# Patient Record
Sex: FEMALE | Race: BLACK OR AFRICAN AMERICAN | NOT HISPANIC OR LATINO | URBAN - METROPOLITAN AREA
[De-identification: names, ages, dates, MRNs, and addresses within clinical notes are randomized per-mention and may not be internally consistent; named-entity substitution may affect disease eponyms.]

---

## 2017-01-29 ENCOUNTER — EMERGENCY (EMERGENCY)
Facility: HOSPITAL | Age: 32
LOS: 1 days | Discharge: HOME | End: 2017-01-29
Attending: EMERGENCY MEDICINE | Admitting: EMERGENCY MEDICINE
Payer: SELF-PAY

## 2017-01-29 VITALS — DIASTOLIC BLOOD PRESSURE: 74 MMHG | HEART RATE: 72 BPM | SYSTOLIC BLOOD PRESSURE: 107 MMHG

## 2017-01-29 VITALS
OXYGEN SATURATION: 97 % | SYSTOLIC BLOOD PRESSURE: 120 MMHG | HEIGHT: 66 IN | DIASTOLIC BLOOD PRESSURE: 68 MMHG | TEMPERATURE: 98 F | HEART RATE: 61 BPM | WEIGHT: 188.94 LBS | RESPIRATION RATE: 16 BRPM

## 2017-01-29 DIAGNOSIS — Z98.89 OTHER SPECIFIED POSTPROCEDURAL STATES: Chronic | ICD-10-CM

## 2017-01-29 LAB
ALBUMIN SERPL ELPH-MCNC: 3.9 G/DL — SIGNIFICANT CHANGE UP (ref 3.3–5)
ALP SERPL-CCNC: 52 U/L — SIGNIFICANT CHANGE UP (ref 40–120)
ALT FLD-CCNC: 23 U/L RC — SIGNIFICANT CHANGE UP (ref 10–45)
ANION GAP SERPL CALC-SCNC: 16 MMOL/L — SIGNIFICANT CHANGE UP (ref 5–17)
APPEARANCE UR: CLEAR — SIGNIFICANT CHANGE UP
AST SERPL-CCNC: 14 U/L — SIGNIFICANT CHANGE UP (ref 10–40)
BACTERIA # UR AUTO: ABNORMAL /HPF
BASOPHILS # BLD AUTO: 0.1 K/UL — SIGNIFICANT CHANGE UP (ref 0–0.2)
BASOPHILS NFR BLD AUTO: 1.1 % — SIGNIFICANT CHANGE UP (ref 0–2)
BILIRUB SERPL-MCNC: 0.2 MG/DL — SIGNIFICANT CHANGE UP (ref 0.2–1.2)
BILIRUB UR-MCNC: NEGATIVE — SIGNIFICANT CHANGE UP
BUN SERPL-MCNC: 13 MG/DL — SIGNIFICANT CHANGE UP (ref 7–23)
CALCIUM SERPL-MCNC: 9.1 MG/DL — SIGNIFICANT CHANGE UP (ref 8.4–10.5)
CHLORIDE SERPL-SCNC: 100 MMOL/L — SIGNIFICANT CHANGE UP (ref 96–108)
CO2 SERPL-SCNC: 21 MMOL/L — LOW (ref 22–31)
COLOR SPEC: YELLOW — SIGNIFICANT CHANGE UP
CREAT SERPL-MCNC: 0.71 MG/DL — SIGNIFICANT CHANGE UP (ref 0.5–1.3)
DIFF PNL FLD: NEGATIVE — SIGNIFICANT CHANGE UP
EOSINOPHIL # BLD AUTO: 0.2 K/UL — SIGNIFICANT CHANGE UP (ref 0–0.5)
EOSINOPHIL NFR BLD AUTO: 2.5 % — SIGNIFICANT CHANGE UP (ref 0–6)
EPI CELLS # UR: SIGNIFICANT CHANGE UP /HPF
GAS PNL BLDV: SIGNIFICANT CHANGE UP
GLUCOSE SERPL-MCNC: 85 MG/DL — SIGNIFICANT CHANGE UP (ref 70–99)
GLUCOSE UR QL: NEGATIVE — SIGNIFICANT CHANGE UP
HCG SERPL-ACNC: SIGNIFICANT CHANGE UP MIU/ML
HCT VFR BLD CALC: 37.9 % — SIGNIFICANT CHANGE UP (ref 34.5–45)
HGB BLD-MCNC: 12.9 G/DL — SIGNIFICANT CHANGE UP (ref 11.5–15.5)
KETONES UR-MCNC: ABNORMAL
LEUKOCYTE ESTERASE UR-ACNC: ABNORMAL
LIDOCAIN IGE QN: 28 U/L — SIGNIFICANT CHANGE UP (ref 7–60)
LYMPHOCYTES # BLD AUTO: 2.5 K/UL — SIGNIFICANT CHANGE UP (ref 1–3.3)
LYMPHOCYTES # BLD AUTO: 30 % — SIGNIFICANT CHANGE UP (ref 13–44)
MCHC RBC-ENTMCNC: 29.8 PG — SIGNIFICANT CHANGE UP (ref 27–34)
MCHC RBC-ENTMCNC: 34 GM/DL — SIGNIFICANT CHANGE UP (ref 32–36)
MCV RBC AUTO: 87.8 FL — SIGNIFICANT CHANGE UP (ref 80–100)
MONOCYTES # BLD AUTO: 0.7 K/UL — SIGNIFICANT CHANGE UP (ref 0–0.9)
MONOCYTES NFR BLD AUTO: 8.9 % — SIGNIFICANT CHANGE UP (ref 2–14)
NEUTROPHILS # BLD AUTO: 4.8 K/UL — SIGNIFICANT CHANGE UP (ref 1.8–7.4)
NEUTROPHILS NFR BLD AUTO: 57.5 % — SIGNIFICANT CHANGE UP (ref 43–77)
NITRITE UR-MCNC: NEGATIVE — SIGNIFICANT CHANGE UP
PH UR: 6 — SIGNIFICANT CHANGE UP (ref 4.8–8)
PLATELET # BLD AUTO: 203 K/UL — SIGNIFICANT CHANGE UP (ref 150–400)
POTASSIUM SERPL-MCNC: 4.1 MMOL/L — SIGNIFICANT CHANGE UP (ref 3.5–5.3)
POTASSIUM SERPL-SCNC: 4.1 MMOL/L — SIGNIFICANT CHANGE UP (ref 3.5–5.3)
PROT SERPL-MCNC: 7.7 G/DL — SIGNIFICANT CHANGE UP (ref 6–8.3)
PROT UR-MCNC: SIGNIFICANT CHANGE UP
RBC # BLD: 4.32 M/UL — SIGNIFICANT CHANGE UP (ref 3.8–5.2)
RBC # FLD: 12.7 % — SIGNIFICANT CHANGE UP (ref 10.3–14.5)
RBC CASTS # UR COMP ASSIST: SIGNIFICANT CHANGE UP /HPF (ref 0–2)
SODIUM SERPL-SCNC: 137 MMOL/L — SIGNIFICANT CHANGE UP (ref 135–145)
SP GR SPEC: 1.03 — HIGH (ref 1.01–1.02)
UROBILINOGEN FLD QL: NEGATIVE — SIGNIFICANT CHANGE UP
WBC # BLD: 8.3 K/UL — SIGNIFICANT CHANGE UP (ref 3.8–10.5)
WBC # FLD AUTO: 8.3 K/UL — SIGNIFICANT CHANGE UP (ref 3.8–10.5)
WBC UR QL: SIGNIFICANT CHANGE UP /HPF (ref 0–5)

## 2017-01-29 PROCEDURE — 82435 ASSAY OF BLOOD CHLORIDE: CPT

## 2017-01-29 PROCEDURE — 85027 COMPLETE CBC AUTOMATED: CPT

## 2017-01-29 PROCEDURE — 87086 URINE CULTURE/COLONY COUNT: CPT

## 2017-01-29 PROCEDURE — 83605 ASSAY OF LACTIC ACID: CPT

## 2017-01-29 PROCEDURE — 96375 TX/PRO/DX INJ NEW DRUG ADDON: CPT

## 2017-01-29 PROCEDURE — 76815 OB US LIMITED FETUS(S): CPT

## 2017-01-29 PROCEDURE — 93975 VASCULAR STUDY: CPT

## 2017-01-29 PROCEDURE — 82803 BLOOD GASES ANY COMBINATION: CPT

## 2017-01-29 PROCEDURE — 84295 ASSAY OF SERUM SODIUM: CPT

## 2017-01-29 PROCEDURE — 93975 VASCULAR STUDY: CPT | Mod: 26

## 2017-01-29 PROCEDURE — 76817 TRANSVAGINAL US OBSTETRIC: CPT | Mod: 26

## 2017-01-29 PROCEDURE — 76817 TRANSVAGINAL US OBSTETRIC: CPT

## 2017-01-29 PROCEDURE — 80053 COMPREHEN METABOLIC PANEL: CPT

## 2017-01-29 PROCEDURE — 82947 ASSAY GLUCOSE BLOOD QUANT: CPT

## 2017-01-29 PROCEDURE — 82330 ASSAY OF CALCIUM: CPT

## 2017-01-29 PROCEDURE — 84702 CHORIONIC GONADOTROPIN TEST: CPT

## 2017-01-29 PROCEDURE — 76815 OB US LIMITED FETUS(S): CPT | Mod: 26

## 2017-01-29 PROCEDURE — 99284 EMERGENCY DEPT VISIT MOD MDM: CPT | Mod: 25

## 2017-01-29 PROCEDURE — 99285 EMERGENCY DEPT VISIT HI MDM: CPT

## 2017-01-29 PROCEDURE — 96374 THER/PROPH/DIAG INJ IV PUSH: CPT

## 2017-01-29 PROCEDURE — 85014 HEMATOCRIT: CPT

## 2017-01-29 PROCEDURE — 83690 ASSAY OF LIPASE: CPT

## 2017-01-29 PROCEDURE — 81001 URINALYSIS AUTO W/SCOPE: CPT

## 2017-01-29 PROCEDURE — 84132 ASSAY OF SERUM POTASSIUM: CPT

## 2017-01-29 RX ORDER — NITROFURANTOIN MACROCRYSTAL 50 MG
100 CAPSULE ORAL ONCE
Qty: 0 | Refills: 0 | Status: COMPLETED | OUTPATIENT
Start: 2017-01-29 | End: 2017-01-29

## 2017-01-29 RX ORDER — ONDANSETRON 8 MG/1
4 TABLET, FILM COATED ORAL ONCE
Qty: 0 | Refills: 0 | Status: COMPLETED | OUTPATIENT
Start: 2017-01-29 | End: 2017-01-29

## 2017-01-29 RX ORDER — NITROFURANTOIN MACROCRYSTAL 50 MG
1 CAPSULE ORAL
Qty: 14 | Refills: 0
Start: 2017-01-29 | End: 2017-02-05

## 2017-01-29 RX ORDER — ONDANSETRON 8 MG/1
1 TABLET, FILM COATED ORAL
Qty: 10 | Refills: 0 | OUTPATIENT
Start: 2017-01-29 | End: 2017-02-03

## 2017-01-29 RX ORDER — ACETAMINOPHEN 500 MG
1000 TABLET ORAL ONCE
Qty: 0 | Refills: 0 | Status: DISCONTINUED | OUTPATIENT
Start: 2017-01-29 | End: 2017-02-02

## 2017-01-29 RX ADMIN — Medication 202 MILLIGRAM(S): at 16:58

## 2017-01-29 RX ADMIN — Medication 100 MILLIGRAM(S): at 16:58

## 2017-01-29 RX ADMIN — ONDANSETRON 4 MILLIGRAM(S): 8 TABLET, FILM COATED ORAL at 16:17

## 2017-01-29 NOTE — ED PROVIDER NOTE - PHYSICAL EXAMINATION
A&O x 3 in mild distress 2/2 nausea and abdominal pain. Heart RRR w/o murmur. Lungs CTA b/l without wheezes, rhonchi, or rales. Abdomen LLQ tenderness to palpation. Speculum exam no blood. Thick white cervical discharge noted on exam. No CMT or adnexal tenderness noted on bimanual exam. No CVAT elicited. CN 2-12 grossly intact without sensory or motor deficit noted. No vascular compromise noted.

## 2017-01-29 NOTE — ED PROVIDER NOTE - MEDICAL DECISION MAKING DETAILS
Attending MD Rodriguez: 31F with no reported PMH presents with nausea, vomiting, lower abdominal cramping pain and intermittent spotting in pregnancy.  Reports , LMP 16, reports discovered pregnancy last week with home urine pregnancy.  Has had no Ob/Gyn care this pregnancy.  Reports has noted increased white discharge, reports intermittent scant spotting.  Denies dysuria, hematuria, change in urinary habits including frequency, urgency. Denies diarrhea, blood in stools.  Denies chest pain, shortness of breath, weakness, dizziness, change in vision. On exam, head NCAT, PERRL, FROM at neck, no tenderness to palpation or stepoffs along length of spine, lungs CTAB with good inspiratory effort, +S1S2, no m/r/g, abdomen soft with +BS, +mild diffuse tenderness to lower abdomen, no rebound, no guarding, no CVAT, pelvis exam no blood in vaginal vault, +white discharge with small clump, os closed, no adnexal tenderness or CMT on bimanual, moving all extremities with 5/5 strength bilateral upper and lower extremities, good and equal  strength bilaterally; Plan: IVFs, antiemetic, Labs, U/S Pelvic

## 2017-01-29 NOTE — ED ADULT NURSE NOTE - OBJECTIVE STATEMENT
32 yo F pw N/V and abd cramping x 2 weeks. +UCG, LMP last month. Denies vaginal bleeding. VSS. NAD. no pmhx. g3 32 yo F pw N/V and abd cramping x 2 weeks. +UCG, LMP last month. Denies vaginal bleeding. VSS. NAD. no pmhx. . Abd diffusely tender. +bs. Lungs clear and equal. afebrile.

## 2017-01-29 NOTE — ED PROVIDER NOTE - ATTENDING CONTRIBUTION TO CARE
Attending MD Rodriguez:   I personally have seen and examined this patient.  Physician assistant note reviewed and agree on plan of care and except where noted.  See MDM for details.

## 2017-01-29 NOTE — ED PROVIDER NOTE - PLAN OF CARE
- Take Tylenol 1000mg every 6 hours for pain  - Take Zofran - Take Tylenol 1000mg every 6 hours for pain  - Take Zofran 8mg every 12 hours as needed for nausea  - Take Macrobid 100mg twice per day with meals for 7 days for UTI  - Follow up with recommended GYN doctor provided to you by our staff regarding your ovarian cysts. Follow up 6 weeks after your appointment at [facility].  - Return to ER if you experience worsening symptoms such as increased bleeding, increased vomiting, or sign of infection such as high fever.

## 2017-01-29 NOTE — ED PROVIDER NOTE - OBJECTIVE STATEMENT
30 yo F G?P? with no significant 30 yo F  with 1 therapeutic  currently pregnant at 6 weeks with no significant PMHx presents with 3 days nausea and vomiting a/w vaginal bleeding. Pt c/o left sided abdominal pain. Denies lightheadedness, bloody stool or urine, diarrhea, dysuria, daily meds. Pt states she does not wish to keep this pregnancy.

## 2017-01-29 NOTE — ED PROVIDER NOTE - PROGRESS NOTE DETAILS
Attending MD Rodriguez: Lab and radiology tests reviewed.  UA noted, macrobid given.  U/S noted, Ob/Gyn called.  Will await consult, discharge as per consult service. GYN consult: no acute GYN pathology. Recommend additional phenergan, ofirmev, termination at [facility] this Friday, and follow up with recommended GYN (list given to pt) in 6 weeks.    Ryan Mosquera PA-C

## 2017-01-30 LAB
CULTURE RESULTS: NO GROWTH — SIGNIFICANT CHANGE UP
SPECIMEN SOURCE: SIGNIFICANT CHANGE UP

## 2017-02-02 DIAGNOSIS — O21.9 VOMITING OF PREGNANCY, UNSPECIFIED: ICD-10-CM

## 2017-07-17 ENCOUNTER — TRANSCRIPTION ENCOUNTER (OUTPATIENT)
Age: 32
End: 2017-07-17

## 2018-09-13 ENCOUNTER — RESULT REVIEW (OUTPATIENT)
Age: 33
End: 2018-09-13

## 2018-10-06 ENCOUNTER — EMERGENCY (EMERGENCY)
Facility: HOSPITAL | Age: 33
LOS: 1 days | Discharge: HOME | End: 2018-10-06
Attending: EMERGENCY MEDICINE | Admitting: EMERGENCY MEDICINE
Payer: MEDICAID

## 2018-10-06 DIAGNOSIS — Z98.89 OTHER SPECIFIED POSTPROCEDURAL STATES: Chronic | ICD-10-CM

## 2018-10-06 PROCEDURE — 73630 X-RAY EXAM OF FOOT: CPT | Mod: 26,LT

## 2018-10-06 PROCEDURE — 99283 EMERGENCY DEPT VISIT LOW MDM: CPT | Mod: 25

## 2018-10-06 RX ORDER — ACETAMINOPHEN 500 MG
975 TABLET ORAL ONCE
Qty: 0 | Refills: 0 | Status: COMPLETED | OUTPATIENT
Start: 2018-10-06 | End: 2018-10-06

## 2018-10-06 RX ORDER — OXYCODONE HYDROCHLORIDE 5 MG/1
5 TABLET ORAL ONCE
Qty: 0 | Refills: 0 | Status: DISCONTINUED | OUTPATIENT
Start: 2018-10-06 | End: 2018-10-06

## 2018-10-06 RX ORDER — IBUPROFEN 200 MG
600 TABLET ORAL ONCE
Qty: 0 | Refills: 0 | Status: COMPLETED | OUTPATIENT
Start: 2018-10-06 | End: 2018-10-06

## 2018-10-06 RX ADMIN — Medication 975 MILLIGRAM(S): at 06:40

## 2018-10-06 RX ADMIN — OXYCODONE HYDROCHLORIDE 5 MILLIGRAM(S): 5 TABLET ORAL at 07:11

## 2018-10-06 RX ADMIN — Medication 600 MILLIGRAM(S): at 06:40

## 2018-10-06 NOTE — ED PROVIDER NOTE - MEDICAL DECISION MAKING DETAILS
34 y/o F with left 1st toe pain after "stubbing" injury.  Plan for XR, pain meds, reassess.  r/o fracture.

## 2018-10-06 NOTE — ED PROVIDER NOTE - OBJECTIVE STATEMENT
32 y/o healthy F with left 1st toe pain.  Pt states she stubbed her toe walking up the stairs earlier tonight.  A little afterwards someone stumbled over the same foot/toe.  No fall to ground or other injuries.

## 2019-07-07 ENCOUNTER — EMERGENCY (EMERGENCY)
Facility: HOSPITAL | Age: 34
LOS: 1 days | Discharge: HOME | End: 2019-07-07
Attending: EMERGENCY MEDICINE | Admitting: EMERGENCY MEDICINE
Payer: MEDICAID

## 2019-07-07 VITALS
SYSTOLIC BLOOD PRESSURE: 124 MMHG | HEIGHT: 65 IN | RESPIRATION RATE: 18 BRPM | WEIGHT: 179.9 LBS | TEMPERATURE: 98 F | DIASTOLIC BLOOD PRESSURE: 78 MMHG | HEART RATE: 89 BPM | OXYGEN SATURATION: 99 %

## 2019-07-07 VITALS
RESPIRATION RATE: 16 BRPM | HEART RATE: 89 BPM | SYSTOLIC BLOOD PRESSURE: 107 MMHG | OXYGEN SATURATION: 97 % | DIASTOLIC BLOOD PRESSURE: 60 MMHG | TEMPERATURE: 98 F

## 2019-07-07 DIAGNOSIS — Z98.89 OTHER SPECIFIED POSTPROCEDURAL STATES: Chronic | ICD-10-CM

## 2019-07-07 LAB — S PYO AG SPEC QL IA: NEGATIVE — SIGNIFICANT CHANGE UP

## 2019-07-07 PROCEDURE — 99284 EMERGENCY DEPT VISIT MOD MDM: CPT

## 2019-07-07 PROCEDURE — 99283 EMERGENCY DEPT VISIT LOW MDM: CPT

## 2019-07-07 PROCEDURE — 87880 STREP A ASSAY W/OPTIC: CPT

## 2019-07-07 PROCEDURE — 87081 CULTURE SCREEN ONLY: CPT

## 2019-07-07 RX ORDER — OXYCODONE HYDROCHLORIDE 5 MG/1
1 TABLET ORAL
Qty: 8 | Refills: 0
Start: 2019-07-07 | End: 2019-07-08

## 2019-07-07 RX ORDER — DEXAMETHASONE 0.5 MG/5ML
10 ELIXIR ORAL ONCE
Refills: 0 | Status: COMPLETED | OUTPATIENT
Start: 2019-07-07 | End: 2019-07-07

## 2019-07-07 RX ADMIN — Medication 10 MILLIGRAM(S): at 05:50

## 2019-07-07 NOTE — ED PROVIDER NOTE - NS ED ATTENDING STATEMENT MOD
I have personally seen and examined this patient.  I have fully participated in the care of this patient. I have reviewed all pertinent clinical information, including history, physical exam, plan and the Resident’s note and agree except as noted. I have personally seen and examined this patient.  I have fully participated in the care of this patient. I have reviewed all pertinent clinical information, including history, physical exam, plan and the Medical Student and Resident’s note and agree except as noted.

## 2019-07-07 NOTE — ED PROVIDER NOTE - ATTENDING CONTRIBUTION TO CARE
33y F no signif PMH here with c/o L ear and L jaw pain x 2 days, assoc w sore throat. No HA, vision change. Afebrile. No meningismus. Tolerating secretions. No mastoid TTP. Poor dentition. Suspect viral URI. Jaw pain may be referred sinus pain vs pain from dental caries, however no signs of dental abscess presently. Will tx sx with analgesia. Can FU as OP w dental clinic.

## 2019-07-07 NOTE — ED ADULT NURSE NOTE - NSIMPLEMENTINTERV_GEN_ALL_ED
Implemented All Universal Safety Interventions:  [place] to call system. Call bell, personal items and telephone within reach. Instruct patient to call for assistance. Room bathroom lighting operational. Non-slip footwear when patient is off stretcher. Physically safe environment: no spills, clutter or unnecessary equipment. Stretcher in lowest position, wheels locked, appropriate side rails in place.

## 2019-07-07 NOTE — ED PROVIDER NOTE - NSFOLLOWUPINSTRUCTIONS_ED_ALL_ED_FT
Seen in ED for viral syndrome. Drink plenty of fluids, get plenty of sleep, for fever or bodyaches take Tylenol 650mg every 4 hours and Motrin 600mg every 6 hours as needed. See regular doctor within 2-3 days. Return to ED for any new or worsening symptoms.   - Please follow up with Dental clinic for jaw pain.    - Please Take Oxycodone one tab every 6 hours as needed for severe pain. NEVER DRIVE OR OPERATE MACHINERY ON OXYCODONE IT WILL CAUSE ACCIDENTS.

## 2019-07-07 NOTE — ED PROVIDER NOTE - OBJECTIVE STATEMENT
Pt is a 34yo F w/o significant PMH who presents to the ED with CCO of left ear and jaw pain. Pt explains that this pain started 2 days ago, she describes it as a shooting pain that goes between her left jaw and her left ear. She rates this pain to be a 10/10 at it's worst and has been taking ibuprofen and Aleve to help with the pain - pt state she took 5x Aleve right before coming in and currently rates her pain to be a 5/10. She also states her throat feels irritated and endorses a little difficulty swallowing. Pt does admit to some nausea and abdominal pain but she thinks it may be related to all of the Aleve she has been taking. Pt denies pain with movement of the ear, fevers, chills, sweats, changes in her voice, runny nose, dizziness, changes in hearing, chest pain, shortness of breath, cough, vomiting, or diarrhea. LMP was 3 weeks ago.

## 2019-07-07 NOTE — ED PROVIDER NOTE - ENMT, MLM
Airway patent, Nasal mucosa clear. Mouth with normal mucosa. Bilateral tonsils are symmetrically erythematous, and edematous with an exudate on the right tonsil. Uvula is midline. External ear is non-tender, bilateral canals are erythematous, right TM is clear with cone of light, left TM is erythematous but is not bulging.

## 2019-07-07 NOTE — ED PROVIDER NOTE - NSFOLLOWUPCLINICS_GEN_ALL_ED_FT
[facility]  Dental  [address] [place] [id number]  Phone: [phone]  Fax:   Follow Up Time:

## 2019-07-07 NOTE — ED ADULT NURSE NOTE - OBJECTIVE STATEMENT
34 y/o female a+ox3, denies pmhx, coming from home for left ear and jaw pain x few days. Onset of sharp pain to left lower jaw after "eating a candy"; pain constant with temporary relief from NSAID; pain progressed to left ear with sore throat, came to ED. Pt denies fever or chills, dysphagia, neck pain, difficulty breathing, abdominal pain, n/v/d, chest pain or discomfort. Medication given as per MD order. Pt left in position of comfort, will reassess

## 2019-07-07 NOTE — ED PROVIDER NOTE - CLINICAL SUMMARY MEDICAL DECISION MAKING FREE TEXT BOX
tonsilitis viral vs bacterial, will get strep rapid test as centor is 3. decadron for throat comfort. pain decr to 5/10 now.

## 2020-01-09 ENCOUNTER — EMERGENCY (EMERGENCY)
Facility: HOSPITAL | Age: 35
LOS: 1 days | Discharge: HOME | End: 2020-01-09
Attending: EMERGENCY MEDICINE
Payer: MEDICAID

## 2020-01-09 VITALS
RESPIRATION RATE: 18 BRPM | SYSTOLIC BLOOD PRESSURE: 144 MMHG | TEMPERATURE: 98 F | OXYGEN SATURATION: 99 % | DIASTOLIC BLOOD PRESSURE: 102 MMHG | HEART RATE: 87 BPM | HEIGHT: 65 IN | WEIGHT: 184.97 LBS

## 2020-01-09 VITALS
HEART RATE: 73 BPM | TEMPERATURE: 98 F | OXYGEN SATURATION: 100 % | DIASTOLIC BLOOD PRESSURE: 67 MMHG | RESPIRATION RATE: 18 BRPM | SYSTOLIC BLOOD PRESSURE: 117 MMHG

## 2020-01-09 DIAGNOSIS — Z98.89 OTHER SPECIFIED POSTPROCEDURAL STATES: Chronic | ICD-10-CM

## 2020-01-09 PROCEDURE — 99284 EMERGENCY DEPT VISIT MOD MDM: CPT | Mod: 25

## 2020-01-09 PROCEDURE — 99283 EMERGENCY DEPT VISIT LOW MDM: CPT

## 2020-01-09 PROCEDURE — 72100 X-RAY EXAM L-S SPINE 2/3 VWS: CPT | Mod: 26

## 2020-01-09 PROCEDURE — 96372 THER/PROPH/DIAG INJ SC/IM: CPT

## 2020-01-09 PROCEDURE — 73552 X-RAY EXAM OF FEMUR 2/>: CPT | Mod: 26,RT

## 2020-01-09 PROCEDURE — 73502 X-RAY EXAM HIP UNI 2-3 VIEWS: CPT | Mod: 26,RT

## 2020-01-09 PROCEDURE — 73502 X-RAY EXAM HIP UNI 2-3 VIEWS: CPT

## 2020-01-09 PROCEDURE — 73552 X-RAY EXAM OF FEMUR 2/>: CPT

## 2020-01-09 PROCEDURE — 72100 X-RAY EXAM L-S SPINE 2/3 VWS: CPT

## 2020-01-09 RX ORDER — KETOROLAC TROMETHAMINE 30 MG/ML
15 SYRINGE (ML) INJECTION ONCE
Refills: 0 | Status: DISCONTINUED | OUTPATIENT
Start: 2020-01-09 | End: 2020-01-09

## 2020-01-09 RX ORDER — LIDOCAINE 4 G/100G
1 CREAM TOPICAL ONCE
Refills: 0 | Status: COMPLETED | OUTPATIENT
Start: 2020-01-09 | End: 2020-01-09

## 2020-01-09 RX ORDER — IBUPROFEN 200 MG
1 TABLET ORAL
Qty: 90 | Refills: 0
Start: 2020-01-09 | End: 2020-02-07

## 2020-01-09 RX ORDER — ACETAMINOPHEN 500 MG
975 TABLET ORAL ONCE
Refills: 0 | Status: COMPLETED | OUTPATIENT
Start: 2020-01-09 | End: 2020-01-09

## 2020-01-09 RX ORDER — ACETAMINOPHEN 500 MG
1 TABLET ORAL
Qty: 90 | Refills: 0
Start: 2020-01-09 | End: 2020-02-07

## 2020-01-09 RX ORDER — METHOCARBAMOL 500 MG/1
500 TABLET, FILM COATED ORAL ONCE
Refills: 0 | Status: COMPLETED | OUTPATIENT
Start: 2020-01-09 | End: 2020-01-09

## 2020-01-09 RX ORDER — METHOCARBAMOL 500 MG/1
1 TABLET, FILM COATED ORAL
Qty: 15 | Refills: 0
Start: 2020-01-09 | End: 2020-01-13

## 2020-01-09 RX ADMIN — METHOCARBAMOL 500 MILLIGRAM(S): 500 TABLET, FILM COATED ORAL at 06:19

## 2020-01-09 RX ADMIN — Medication 975 MILLIGRAM(S): at 04:53

## 2020-01-09 RX ADMIN — Medication 15 MILLIGRAM(S): at 06:08

## 2020-01-09 RX ADMIN — LIDOCAINE 1 PATCH: 4 CREAM TOPICAL at 04:54

## 2020-01-09 RX ADMIN — Medication 975 MILLIGRAM(S): at 05:23

## 2020-01-09 NOTE — ED PROVIDER NOTE - NS ED ROS FT
GENERAL: No fever or chills, EYES: no change in vision, HEENT: no trouble speaking, CARDIAC: no chest pain, palpitation PULMONARY: no cough or SOB, GI: no abdominal pain, no nausea, no vomiting, no diarrhea or constipation, : No changes in urination, SKIN: no rashes, NEURO: no headache,  MSK: +groin, +leg pain pain ~Aluko Gift, MD

## 2020-01-09 NOTE — ED ADULT NURSE REASSESSMENT NOTE - NS ED NURSE REASSESS COMMENT FT1
Report received by previous RN in [facility]. Report received by previous RN in [place].Pt laying in bed. Family made aware and awaiting discharge papers. Hot packs given to pt in preparation for d/c. VS within limits.

## 2020-01-09 NOTE — ED PROVIDER NOTE - PATIENT PORTAL LINK FT
You can access the FollowMyHealth Patient Portal offered by [facility] by registering at the following website: [email or web address]. By joining [employer]’s FollowMyHealth portal, you will also be able to view your health information using other applications (apps) compatible with our system.

## 2020-01-09 NOTE — ED PROVIDER NOTE - PHYSICAL EXAMINATION
Gen: AAOx3, non-toxic  Head: NCAT  HEENT: EOMI, PERRLA, oral mucosa moist, normal conjunctiva  Lung: CTAB, no respiratory distress, no wheezes/rhonchi/rales B/L, speaking in full sentences  CV: RRR, no murmurs, rubs or gallops  Abd: soft, NTND, no guarding, no CVA tenderness, no rebound tenderness  MSK: no visible deformities,limited ROM of right hip due to pain TTP of posterior, and lateral R hip, DP 2+ eq b/l  Neuro: No focal sensory or motor deficits  Skin: Warm, well perfused, no rash  Psych: normal affect.   ~Pritesh Leyva MD
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
## 2020-01-09 NOTE — ED ADULT NURSE NOTE - NSIMPLEMENTINTERV_GEN_ALL_ED
Implemented All Fall Risk Interventions:  [place] to call system. Call bell, personal items and telephone within reach. Instruct patient to call for assistance. Room bathroom lighting operational. Non-slip footwear when patient is off stretcher. Physically safe environment: no spills, clutter or unnecessary equipment. Stretcher in lowest position, wheels locked, appropriate side rails in place. Provide visual cue, wrist band, yellow gown, etc. Monitor gait and stability. Monitor for mental status changes and reorient to person, place, and time. Review medications for side effects contributing to fall risk. Reinforce activity limits and safety measures with patient and family.

## 2020-01-09 NOTE — ED PROVIDER NOTE - ATTENDING CONTRIBUTION TO CARE
MD Godfrey:  patient seen and evaluated personally.   I agree with the History & Physical,  Impression & Plan other than what was detailed in my note.  MD Godfrey    33 y/o f w/ no sig pmh, presents to ed w/ cc of r hip pain. Pt noted this first started after she fell off the couch. taking motrin for pain. no f/c , n/v, no erythema of hip, no ha, bv, pain going down r leg, pt able to range hip w/ out difficulty, no saddle anestehsia, loc of bladder/stool., appears in pain, pos bowstring signs, no erythema, bruising, no midline ttp, plan for x ray hip, pelvis, also l spine

## 2020-08-05 ENCOUNTER — INPATIENT (INPATIENT)
Facility: HOSPITAL | Age: 35
LOS: 1 days | Discharge: HOME | End: 2020-08-07
Attending: OBSTETRICS & GYNECOLOGY | Admitting: OBSTETRICS & GYNECOLOGY
Payer: MEDICAID

## 2020-08-05 VITALS
TEMPERATURE: 99 F | OXYGEN SATURATION: 99 % | SYSTOLIC BLOOD PRESSURE: 128 MMHG | HEART RATE: 95 BPM | RESPIRATION RATE: 18 BRPM | DIASTOLIC BLOOD PRESSURE: 86 MMHG

## 2020-08-05 DIAGNOSIS — Z98.89 OTHER SPECIFIED POSTPROCEDURAL STATES: Chronic | ICD-10-CM

## 2020-08-05 LAB
ALBUMIN SERPL ELPH-MCNC: 4 G/DL — SIGNIFICANT CHANGE UP (ref 3.3–5)
ALP SERPL-CCNC: 85 U/L — SIGNIFICANT CHANGE UP (ref 40–120)
ALT FLD-CCNC: 164 U/L — HIGH (ref 4–33)
ANION GAP SERPL CALC-SCNC: 13 MMO/L — SIGNIFICANT CHANGE UP (ref 7–14)
APPEARANCE UR: SIGNIFICANT CHANGE UP
AST SERPL-CCNC: 37 U/L — HIGH (ref 4–32)
BACTERIA # UR AUTO: NEGATIVE — SIGNIFICANT CHANGE UP
BASOPHILS # BLD AUTO: 0.06 K/UL — SIGNIFICANT CHANGE UP (ref 0–0.2)
BASOPHILS NFR BLD AUTO: 0.4 % — SIGNIFICANT CHANGE UP (ref 0–2)
BILIRUB SERPL-MCNC: 0.2 MG/DL — SIGNIFICANT CHANGE UP (ref 0.2–1.2)
BILIRUB UR-MCNC: NEGATIVE — SIGNIFICANT CHANGE UP
BLOOD UR QL VISUAL: HIGH
BUN SERPL-MCNC: 10 MG/DL — SIGNIFICANT CHANGE UP (ref 7–23)
CALCIUM SERPL-MCNC: 9.1 MG/DL — SIGNIFICANT CHANGE UP (ref 8.4–10.5)
CHLORIDE SERPL-SCNC: 102 MMOL/L — SIGNIFICANT CHANGE UP (ref 98–107)
CO2 SERPL-SCNC: 23 MMOL/L — SIGNIFICANT CHANGE UP (ref 22–31)
COLOR SPEC: SIGNIFICANT CHANGE UP
CREAT SERPL-MCNC: 0.93 MG/DL — SIGNIFICANT CHANGE UP (ref 0.5–1.3)
EOSINOPHIL # BLD AUTO: 0.4 K/UL — SIGNIFICANT CHANGE UP (ref 0–0.5)
EOSINOPHIL NFR BLD AUTO: 3 % — SIGNIFICANT CHANGE UP (ref 0–6)
GLUCOSE SERPL-MCNC: 103 MG/DL — HIGH (ref 70–99)
GLUCOSE UR-MCNC: NEGATIVE — SIGNIFICANT CHANGE UP
HCT VFR BLD CALC: 37.4 % — SIGNIFICANT CHANGE UP (ref 34.5–45)
HGB BLD-MCNC: 12.3 G/DL — SIGNIFICANT CHANGE UP (ref 11.5–15.5)
HYALINE CASTS # UR AUTO: NEGATIVE — SIGNIFICANT CHANGE UP
IMM GRANULOCYTES NFR BLD AUTO: 0.4 % — SIGNIFICANT CHANGE UP (ref 0–1.5)
KETONES UR-MCNC: NEGATIVE — SIGNIFICANT CHANGE UP
LEUKOCYTE ESTERASE UR-ACNC: SIGNIFICANT CHANGE UP
LYMPHOCYTES # BLD AUTO: 18.6 % — SIGNIFICANT CHANGE UP (ref 13–44)
LYMPHOCYTES # BLD AUTO: 2.52 K/UL — SIGNIFICANT CHANGE UP (ref 1–3.3)
MCHC RBC-ENTMCNC: 30.2 PG — SIGNIFICANT CHANGE UP (ref 27–34)
MCHC RBC-ENTMCNC: 32.9 % — SIGNIFICANT CHANGE UP (ref 32–36)
MCV RBC AUTO: 91.9 FL — SIGNIFICANT CHANGE UP (ref 80–100)
MONOCYTES # BLD AUTO: 0.98 K/UL — HIGH (ref 0–0.9)
MONOCYTES NFR BLD AUTO: 7.2 % — SIGNIFICANT CHANGE UP (ref 2–14)
NEUTROPHILS # BLD AUTO: 9.51 K/UL — HIGH (ref 1.8–7.4)
NEUTROPHILS NFR BLD AUTO: 70.4 % — SIGNIFICANT CHANGE UP (ref 43–77)
NITRITE UR-MCNC: NEGATIVE — SIGNIFICANT CHANGE UP
NRBC # FLD: 0 K/UL — SIGNIFICANT CHANGE UP (ref 0–0)
PH UR: 6 — SIGNIFICANT CHANGE UP (ref 5–8)
PLATELET # BLD AUTO: 233 K/UL — SIGNIFICANT CHANGE UP (ref 150–400)
PMV BLD: 9 FL — SIGNIFICANT CHANGE UP (ref 7–13)
POTASSIUM SERPL-MCNC: 4.2 MMOL/L — SIGNIFICANT CHANGE UP (ref 3.5–5.3)
POTASSIUM SERPL-SCNC: 4.2 MMOL/L — SIGNIFICANT CHANGE UP (ref 3.5–5.3)
PROT SERPL-MCNC: 7.4 G/DL — SIGNIFICANT CHANGE UP (ref 6–8.3)
PROT UR-MCNC: 70 — SIGNIFICANT CHANGE UP
RBC # BLD: 4.07 M/UL — SIGNIFICANT CHANGE UP (ref 3.8–5.2)
RBC # FLD: 13.4 % — SIGNIFICANT CHANGE UP (ref 10.3–14.5)
RBC CASTS # UR COMP ASSIST: >50 — HIGH (ref 0–?)
SODIUM SERPL-SCNC: 138 MMOL/L — SIGNIFICANT CHANGE UP (ref 135–145)
SP GR SPEC: 1.01 — SIGNIFICANT CHANGE UP (ref 1–1.04)
SQUAMOUS # UR AUTO: SIGNIFICANT CHANGE UP
UROBILINOGEN FLD QL: NORMAL — SIGNIFICANT CHANGE UP
WBC # BLD: 13.53 K/UL — HIGH (ref 3.8–10.5)
WBC # FLD AUTO: 13.53 K/UL — HIGH (ref 3.8–10.5)
WBC UR QL: HIGH (ref 0–?)

## 2020-08-05 PROCEDURE — 93976 VASCULAR STUDY: CPT | Mod: 26

## 2020-08-05 PROCEDURE — 76830 TRANSVAGINAL US NON-OB: CPT | Mod: 26

## 2020-08-05 PROCEDURE — 99285 EMERGENCY DEPT VISIT HI MDM: CPT

## 2020-08-05 PROCEDURE — 76856 US EXAM PELVIC COMPLETE: CPT | Mod: 26

## 2020-08-05 RX ORDER — ACETAMINOPHEN 500 MG
650 TABLET ORAL ONCE
Refills: 0 | Status: COMPLETED | OUTPATIENT
Start: 2020-08-05 | End: 2020-08-05

## 2020-08-05 RX ORDER — CEFTRIAXONE 500 MG/1
1000 INJECTION, POWDER, FOR SOLUTION INTRAMUSCULAR; INTRAVENOUS ONCE
Refills: 0 | Status: COMPLETED | OUTPATIENT
Start: 2020-08-05 | End: 2020-08-05

## 2020-08-05 RX ORDER — KETOROLAC TROMETHAMINE 30 MG/ML
15 SYRINGE (ML) INJECTION ONCE
Refills: 0 | Status: DISCONTINUED | OUTPATIENT
Start: 2020-08-05 | End: 2020-08-05

## 2020-08-05 RX ORDER — SODIUM CHLORIDE 9 MG/ML
1000 INJECTION INTRAMUSCULAR; INTRAVENOUS; SUBCUTANEOUS ONCE
Refills: 0 | Status: COMPLETED | OUTPATIENT
Start: 2020-08-05 | End: 2020-08-05

## 2020-08-05 RX ORDER — MORPHINE SULFATE 50 MG/1
2 CAPSULE, EXTENDED RELEASE ORAL ONCE
Refills: 0 | Status: DISCONTINUED | OUTPATIENT
Start: 2020-08-05 | End: 2020-08-05

## 2020-08-05 RX ADMIN — MORPHINE SULFATE 2 MILLIGRAM(S): 50 CAPSULE, EXTENDED RELEASE ORAL at 22:15

## 2020-08-05 RX ADMIN — Medication 15 MILLIGRAM(S): at 19:42

## 2020-08-05 RX ADMIN — SODIUM CHLORIDE 1000 MILLILITER(S): 9 INJECTION INTRAMUSCULAR; INTRAVENOUS; SUBCUTANEOUS at 19:34

## 2020-08-05 RX ADMIN — Medication 650 MILLIGRAM(S): at 19:42

## 2020-08-05 NOTE — ED PROVIDER NOTE - ATTENDING CONTRIBUTION TO CARE
34F with no PMHx p/w pelvic pain for the past 3-4 days. patient states pain is constant and radiates to both sides. )(+) pain with urination and achy lower back pain. (+) nausea. no fevers, vomiting, diarrhea.     ***GEN - NAD; well appearing; A+O x3 ***HEAD - NC/AT ***EYES/NOSE - PERRL, EOMI, mucous membranes moist, no discharge ***THROAT: Oral cavity and pharynx normal. No inflammation, swelling, exudate, or lesions.  ***NECK: Neck supple, non-tender without lymphadenopathy, no masses, no thyromegaly.  ***PULMONARY - CTA b/l, symmetric breath sounds. ***CARDIAC -s1s2, RRR, no M,G,R  ***ABDOMEN - +BS, ND, NT, soft, no guarding, no rebound, no masses   ***BACK - no CVA tenderness, Normal  spine ***EXTREMITIES - symmetric pulses, 2+ dp, capillary refill < 2 seconds, no clubbing, no cyanosis, no edema ***SKIN - no rash or bruising   ***NEUROLOGIC - alert, CN 2-12 intact      MDM: 34F with dysuria and pelvic pain. will w/u for uti/ cystitis. labs, urine, ucg, ua, urine culture, pain control

## 2020-08-05 NOTE — ED PROVIDER NOTE - OBJECTIVE STATEMENT
34yF w/no stated pmhx sent to ED by her PMD with lower abdominal pain, dysuria and nausea x 4 days. Pt states 4 days ago she developed painful urination, followed by lower abdominal pain, intermittent nausea and mild b/l low back pain. Pt reports hematuria, urinary frequency and dysuria. Denies fever/chills, weakness, vomiting, vaginal discharge, chest pain, shortness of breath, recent travel or illness or any other concerns. 34yF w/no stated pmhx sent to ED by her GYN with lower abdominal pain, dysuria and nausea x 4 days. Pt states 4 days ago she developed painful urination, followed by lower abdominal pain, intermittent nausea and mild b/l low back pain. Pt reports hematuria, urinary frequency and dysuria. Denies fever/chills, weakness, vomiting, vaginal discharge, chest pain, shortness of breath, recent travel or illness or any other concerns.  Pts GYN Dr.Lorraine Contreras

## 2020-08-05 NOTE — ED ADULT NURSE NOTE - OBJECTIVE STATEMENT
patient Alert & ox3. Patient sent to ER by her PMD for c/o abdominal pain.pt . evaluated by MD.Placed 20g angiocath Lt. AC., labs drawn and sent. Due meds given and ivf ns started as per order.patient will be waiting for results, further evaluation and disposition.  made comfortable.will continue to monitor.

## 2020-08-05 NOTE — ED PROVIDER NOTE - CLINICAL SUMMARY MEDICAL DECISION MAKING FREE TEXT BOX
34yF w/no stated pmhx sent to ED by her PMD with lower abdominal pain, dysuria and nausea x 4 days. On exam pt appears uncomfortable, +suprapubic tenderness. Concern for UTI, pyelo. Plan: cbc/cmp, ua and culture, fluids and analgesia

## 2020-08-06 DIAGNOSIS — N73.0 ACUTE PARAMETRITIS AND PELVIC CELLULITIS: ICD-10-CM

## 2020-08-06 DIAGNOSIS — R79.89 OTHER SPECIFIED ABNORMAL FINDINGS OF BLOOD CHEMISTRY: ICD-10-CM

## 2020-08-06 DIAGNOSIS — R22.9 LOCALIZED SWELLING, MASS AND LUMP, UNSPECIFIED: ICD-10-CM

## 2020-08-06 LAB
AMORPH CRY # UR COMP ASSIST: SIGNIFICANT CHANGE UP (ref 0–0)
APPEARANCE UR: SIGNIFICANT CHANGE UP
BACTERIA # UR AUTO: SIGNIFICANT CHANGE UP
BASOPHILS # BLD AUTO: 0.07 K/UL — SIGNIFICANT CHANGE UP (ref 0–0.2)
BASOPHILS NFR BLD AUTO: 0.7 % — SIGNIFICANT CHANGE UP (ref 0–2)
BILIRUB UR-MCNC: NEGATIVE — SIGNIFICANT CHANGE UP
BLOOD UR QL VISUAL: HIGH
COLOR SPEC: YELLOW — SIGNIFICANT CHANGE UP
EOSINOPHIL # BLD AUTO: 0.52 K/UL — HIGH (ref 0–0.5)
EOSINOPHIL NFR BLD AUTO: 5.2 % — SIGNIFICANT CHANGE UP (ref 0–6)
GLUCOSE UR-MCNC: NEGATIVE — SIGNIFICANT CHANGE UP
HCT VFR BLD CALC: 33.7 % — LOW (ref 34.5–45)
HGB BLD-MCNC: 11.4 G/DL — LOW (ref 11.5–15.5)
IMM GRANULOCYTES NFR BLD AUTO: 0.3 % — SIGNIFICANT CHANGE UP (ref 0–1.5)
KETONES UR-MCNC: NEGATIVE — SIGNIFICANT CHANGE UP
LEUKOCYTE ESTERASE UR-ACNC: SIGNIFICANT CHANGE UP
LYMPHOCYTES # BLD AUTO: 2.8 K/UL — SIGNIFICANT CHANGE UP (ref 1–3.3)
LYMPHOCYTES # BLD AUTO: 28 % — SIGNIFICANT CHANGE UP (ref 13–44)
MCHC RBC-ENTMCNC: 30.7 PG — SIGNIFICANT CHANGE UP (ref 27–34)
MCHC RBC-ENTMCNC: 33.8 % — SIGNIFICANT CHANGE UP (ref 32–36)
MCV RBC AUTO: 90.8 FL — SIGNIFICANT CHANGE UP (ref 80–100)
MONOCYTES # BLD AUTO: 0.77 K/UL — SIGNIFICANT CHANGE UP (ref 0–0.9)
MONOCYTES NFR BLD AUTO: 7.7 % — SIGNIFICANT CHANGE UP (ref 2–14)
NEUTROPHILS # BLD AUTO: 5.8 K/UL — SIGNIFICANT CHANGE UP (ref 1.8–7.4)
NEUTROPHILS NFR BLD AUTO: 58.1 % — SIGNIFICANT CHANGE UP (ref 43–77)
NITRITE UR-MCNC: NEGATIVE — SIGNIFICANT CHANGE UP
NRBC # FLD: 0 K/UL — SIGNIFICANT CHANGE UP (ref 0–0)
PH UR: 6 — SIGNIFICANT CHANGE UP (ref 5–8)
PLATELET # BLD AUTO: 203 K/UL — SIGNIFICANT CHANGE UP (ref 150–400)
PMV BLD: 9 FL — SIGNIFICANT CHANGE UP (ref 7–13)
PROT UR-MCNC: 30 — SIGNIFICANT CHANGE UP
RBC # BLD: 3.71 M/UL — LOW (ref 3.8–5.2)
RBC # FLD: 13.4 % — SIGNIFICANT CHANGE UP (ref 10.3–14.5)
RBC CASTS # UR COMP ASSIST: >50 — HIGH (ref 0–?)
SARS-COV-2 IGG SERPL QL IA: NEGATIVE — SIGNIFICANT CHANGE UP
SARS-COV-2 IGM SERPL IA-ACNC: <3.8 AU/ML — SIGNIFICANT CHANGE UP
SARS-COV-2 RNA SPEC QL NAA+PROBE: SIGNIFICANT CHANGE UP
SP GR SPEC: 1.02 — SIGNIFICANT CHANGE UP (ref 1–1.04)
SQUAMOUS # UR AUTO: SIGNIFICANT CHANGE UP
UROBILINOGEN FLD QL: NORMAL — SIGNIFICANT CHANGE UP
WBC # BLD: 9.99 K/UL — SIGNIFICANT CHANGE UP (ref 3.8–10.5)
WBC # FLD AUTO: 9.99 K/UL — SIGNIFICANT CHANGE UP (ref 3.8–10.5)
WBC UR QL: >50 — HIGH (ref 0–?)

## 2020-08-06 PROCEDURE — 99223 1ST HOSP IP/OBS HIGH 75: CPT

## 2020-08-06 RX ORDER — METRONIDAZOLE 500 MG
500 TABLET ORAL EVERY 12 HOURS
Refills: 0 | Status: DISCONTINUED | OUTPATIENT
Start: 2020-08-06 | End: 2020-08-07

## 2020-08-06 RX ORDER — KETOROLAC TROMETHAMINE 30 MG/ML
15 SYRINGE (ML) INJECTION EVERY 6 HOURS
Refills: 0 | Status: DISCONTINUED | OUTPATIENT
Start: 2020-08-06 | End: 2020-08-07

## 2020-08-06 RX ORDER — SODIUM CHLORIDE 9 MG/ML
1000 INJECTION, SOLUTION INTRAVENOUS
Refills: 0 | Status: DISCONTINUED | OUTPATIENT
Start: 2020-08-06 | End: 2020-08-07

## 2020-08-06 RX ORDER — ACETAMINOPHEN 500 MG
1000 TABLET ORAL EVERY 6 HOURS
Refills: 0 | Status: COMPLETED | OUTPATIENT
Start: 2020-08-06 | End: 2020-08-07

## 2020-08-06 RX ORDER — CEFOTETAN DISODIUM 1 G
2 VIAL (EA) INJECTION EVERY 12 HOURS
Refills: 0 | Status: DISCONTINUED | OUTPATIENT
Start: 2020-08-06 | End: 2020-08-07

## 2020-08-06 RX ORDER — MORPHINE SULFATE 50 MG/1
4 CAPSULE, EXTENDED RELEASE ORAL ONCE
Refills: 0 | Status: DISCONTINUED | OUTPATIENT
Start: 2020-08-06 | End: 2020-08-06

## 2020-08-06 RX ADMIN — Medication 100 MILLIGRAM(S): at 19:27

## 2020-08-06 RX ADMIN — Medication 100 GRAM(S): at 21:00

## 2020-08-06 RX ADMIN — Medication 400 MILLIGRAM(S): at 19:51

## 2020-08-06 RX ADMIN — SODIUM CHLORIDE 125 MILLILITER(S): 9 INJECTION, SOLUTION INTRAVENOUS at 04:46

## 2020-08-06 RX ADMIN — MORPHINE SULFATE 2 MILLIGRAM(S): 50 CAPSULE, EXTENDED RELEASE ORAL at 02:16

## 2020-08-06 RX ADMIN — SODIUM CHLORIDE 125 MILLILITER(S): 9 INJECTION, SOLUTION INTRAVENOUS at 16:30

## 2020-08-06 RX ADMIN — Medication 1000 MILLIGRAM(S): at 20:30

## 2020-08-06 RX ADMIN — Medication 500 MILLIGRAM(S): at 19:27

## 2020-08-06 RX ADMIN — Medication 500 MILLIGRAM(S): at 02:16

## 2020-08-06 RX ADMIN — Medication 650 MILLIGRAM(S): at 02:16

## 2020-08-06 RX ADMIN — Medication 100 MILLIGRAM(S): at 02:16

## 2020-08-06 RX ADMIN — MORPHINE SULFATE 4 MILLIGRAM(S): 50 CAPSULE, EXTENDED RELEASE ORAL at 02:16

## 2020-08-06 RX ADMIN — Medication 400 MILLIGRAM(S): at 12:11

## 2020-08-06 RX ADMIN — Medication 1000 MILLIGRAM(S): at 06:45

## 2020-08-06 RX ADMIN — Medication 400 MILLIGRAM(S): at 06:10

## 2020-08-06 RX ADMIN — MORPHINE SULFATE 4 MILLIGRAM(S): 50 CAPSULE, EXTENDED RELEASE ORAL at 02:46

## 2020-08-06 RX ADMIN — Medication 100 GRAM(S): at 07:00

## 2020-08-06 RX ADMIN — Medication 1000 MILLIGRAM(S): at 13:00

## 2020-08-06 RX ADMIN — CEFTRIAXONE 100 MILLIGRAM(S): 500 INJECTION, POWDER, FOR SOLUTION INTRAMUSCULAR; INTRAVENOUS at 00:06

## 2020-08-06 RX ADMIN — Medication 15 MILLIGRAM(S): at 02:16

## 2020-08-06 RX ADMIN — SODIUM CHLORIDE 125 MILLILITER(S): 9 INJECTION, SOLUTION INTRAVENOUS at 19:55

## 2020-08-06 NOTE — PROVIDER CONTACT NOTE (OTHER) - ASSESSMENT
Patient is having moderate bright red vaginal bleeding without clots. Patient states that her period usually has clots and she is not expecting her period for another week. No acute distress noted.

## 2020-08-06 NOTE — H&P ADULT - NSHPPHYSICALEXAM_GEN_ALL_CORE
Vital Signs Last 24 Hrs  T(C): 37.3 (05 Aug 2020 18:31), Max: 37.3 (05 Aug 2020 18:31)  T(F): 99.1 (05 Aug 2020 18:31), Max: 99.1 (05 Aug 2020 18:31)  HR: 76 (05 Aug 2020 22:16) (76 - 95)  BP: 110/61 (05 Aug 2020 22:16) (110/61 - 128/86)  BP(mean): --  RR: 18 (05 Aug 2020 22:16) (18 - 18)  SpO2: 100% (05 Aug 2020 22:16) (99% - 100%)    Physical Exam:   General: lying in bed, appears uncomfortable; 1 episode of lower abdominal pain during interview  HEENT: neck supple, full ROM, moist mucuous membranes  CV: RRR, nl S1S2 no m/r/g  Lungs: CTA b/l, good air flow b/l   Back: No CVA tenderness bilaterally  Abd: Soft, TTP in lower quadrants diffusely; non-distended.  Normoactive bowel sounds.  : Minimal bleeding on pad. External labia wnl.    Speculum Exam: Scant blood in vault.  Physiologic discharge.    Bimanual Exam: + cervical motion tenderness. Uterus wnl, TTP. Adnexae nonpalpable bilaterally. Cervix closed.  Ext: non-tender b/l, no edema

## 2020-08-06 NOTE — H&P ADULT - PROBLEM SELECTOR PLAN 1
Plan:   Neuro: PO pain meds prn   CV: Hemodynamically stable.  No signs of sepsis at this time with no evidence of hypotension or tachycardia.    Pulm: Saturating well on room air.   GI: regular diet.   : Voiding spontaneously  Heme: patient low risk- no need for mechanical or chemical ppx.  Aggressive ambulation for DVT ppx.    FEN: Fluids: SLIV    Electrolytes: replete prn   ID: -PID: will treat with IV Cefotetan (2g IV q6 hours) and IV doxycycline (100 mg BID) and Flagyl 500mg IV BID. Follow up urine GC/CT and Blood Cx. Will monitor CBC daily for resolution of leukocytosis. Will monitor for resolution of pelvic tenderness.  Will continue to monitor vitals closely.   Dispo: will admit to GYN service     D/w Dr. Williams R. Frankel PGY2

## 2020-08-06 NOTE — H&P ADULT - ASSESSMENT
34y   here for pelvic pain and dysuria x4days.   On exam there is cervical motion tenderness and diffuse low pelvic tenderness. TVUS showing complex left adnexal structure as described, favoring hemorrhagic cyst, although cannot rule out infection/tubo-ovarian abscess. Mild Leukocytosis on CBC concerning for infection.  Given pelvic pain with no clear etiology and +CMT, reasonable to treat empirically for PID as cause of pain.

## 2020-08-06 NOTE — H&P ADULT - NSICDXPASTSURGICALHX_GEN_ALL_CORE_FT
PAST SURGICAL HISTORY:  H/O  section     No Past Surgical History     S/P appendectomy     S/P cholecystectomy     S/P foot surgery in 2013.

## 2020-08-06 NOTE — ED ADULT NURSE REASSESSMENT NOTE - NS ED NURSE REASSESS COMMENT FT1
Pt resting at this time, reports 8/10 pain, medicated as per order. Breathing even and unlabored. NAD. VS as noted. Report given to VANESA House in [facility]. Transporting over to [facility] 1 Spot 12.

## 2020-08-06 NOTE — H&P ADULT - HISTORY OF PRESENT ILLNESS
R2 GYN ADMISSION NOTE    JHONY GREEN  34y  Female [id number]    HPI: 35yo  LMP today presenting with dysuria and pelvic pain x4days. She states she initially thought she had a UTI, took cranberry pills with no relief. States the pain got worse and she endorsed hematuria, frequency, and incontinence. Pelvic pain is diffuse, but more prominent suprapubically. States it feels like a sharp stabbing pain, intermittent in nature, 5/10 at baseline and up to 8/10 during severe episodes. She has taken motrin with no relief. She was started on Ciprofloxacin by Dr. Contreras on Monday for UTI after she had a positive UA. She endorses some mild low back pain, decreased appetite, nausea, but no episodes of vomiting. She denies fevers, chills, light headedness, dizziness, chest pain, SOB, vaginal discharge. She has some mild vaginal bleeding but thinks it may be the start of her menses. She is sexually active with 2 partners, uses condoms 100% of the time.       Primary OBGYN: Dr. Contreras    POB:  2004 - pLTCS [facility] for [facility]L  2009 - FT rLTCS  2 eTOP  PGYN: +fibroids, +cysts; denies endometriosis, STI's, Abnormal pap smears   PMH:denies  PSH: c/s x 2  lsc cholecystectomy -   MEDS: denies  ALL: No Known Allergies  SOCHx: social tobacco and alcohol use; denies other drug use  FH: No h/o VTE. No h/o familial or gyn cancers (no breast, ovarian, uterine, gastric, pancreatic, skin, or eye)          Hospital Meds:   MEDICATIONS  (STANDING):  doxycycline hyclate Capsule 100 milliGRAM(s) Oral every 12 hours  metroNIDAZOLE    Tablet 500 milliGRAM(s) Oral every 12 hours    MEDICATIONS  (PRN):

## 2020-08-06 NOTE — CONSULT NOTE ADULT - ASSESSMENT
34F  pelvic pain and dysuria x4days, cervical motion tenderness, diffuse low pelvic tenderness. TVUS showing complex left adnexal structure favoring hemorrhagic cyst, although cannot rule out infection/tubo-ovarian abscess. Mild Leukocytosis on CBC concerning for infection.  Being treated for PID, possible UTI

## 2020-08-06 NOTE — CONSULT NOTE ADULT - PROBLEM SELECTOR RECOMMENDATION 9
management as per GYN - on broad spectrum antibiotics, IVFs if not tolerating PO, pain control  - GC/Chlamydia pending  - consider check HIV, RPR management as per GYN - on broad spectrum antibiotics, IVFs if not tolerating PO, pain control  UTI - f/u Ucx, check renal US  - GC/Chlamydia pending  - consider check HIV, RPR

## 2020-08-06 NOTE — H&P ADULT - NSHPLABSRESULTS_GEN_ALL_CORE
LABS:                        12.3   13.53 )-----------( 233      ( 05 Aug 2020 19:20 )             37.4     08-    138  |  102  |  10  ----------------------------<  103<H>  4.2   |  23  |  0.93    Ca    9.1      05 Aug 2020 19:20    TPro  7.4  /  Alb  4.0  /  TBili  0.2  /  DBili  x   /  AST  37<H>  /  ALT  164<H>  /  AlkPhos  85  08-    I&O's Detail      Urinalysis Basic - ( 05 Aug 2020 19:30 )    Color: LIGHT BROWN / Appearance: Lt TURBID / S.007 / pH: 6.0  Gluc: NEGATIVE / Ketone: NEGATIVE  / Bili: NEGATIVE / Urobili: NORMAL   Blood: LARGE / Protein: 70 / Nitrite: NEGATIVE   Leuk Esterase: MODERATE / RBC: >50 / WBC 26-50   Sq Epi: OCC / Non Sq Epi: x / Bacteria: NEGATIVE        EXAM:  US TRANSVAGINAL        PROCEDURE DATE:  Aug  5 2020         INTERPRETATION:  CLINICAL INFORMATION: Bilateral pelvic pain. Evaluate for tubo-ovarian abscess or torsion. Urine beta hCG negative.    LMP: 2020    COMPARISON: US 2017. CT 3/9/2016.    TECHNIQUE: Transabdominal and transvaginal ultrasound of the pelvis. Color and spectral Doppler was performed.    FINDINGS:    Uterus: 8.9 x 5.3 x 5.6 cm. A 2.7 x 3.2 x 2.9 cm anterior fibroid. Post surgical changes.    Endometrium: 4 mm. Within normal limits.    Cervix: Nabothian cyst.    Right ovary: Not visualized, limiting evaluation.    Left ovary: 5.5 x 3.5 x 3.8 cm. A 3.2 x 2.5 x 1.9 cm hypoechoic structure containing debris without significant internal vascularity. Small follicles. Arterial/venous waveforms/flow present.    Fluid: Trace.    IMPRESSION:    Complex left adnexal structure as described, favoring hemorrhagic cyst over infection/tubo-ovarian abscess. Clinical correlation is advised to assess for ovarian torsion as a focal lesion may serve as a lead point. In addition, a follow-up pelvic ultrasound is recommended in 6 weeks to assess resolution.            LILY FILIBERTO M.D., RADIOLOGY RESIDENT  This document has been electronically signed.  ASAF BONILLA M.D., ATTENDING RADIOLOGIST  This document has been electronically signed. Aug  5 2020 11:29PM
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
## 2020-08-07 ENCOUNTER — TRANSCRIPTION ENCOUNTER (OUTPATIENT)
Age: 35
End: 2020-08-07

## 2020-08-07 VITALS
HEART RATE: 63 BPM | TEMPERATURE: 99 F | OXYGEN SATURATION: 100 % | SYSTOLIC BLOOD PRESSURE: 128 MMHG | DIASTOLIC BLOOD PRESSURE: 77 MMHG | RESPIRATION RATE: 20 BRPM

## 2020-08-07 DIAGNOSIS — N39.0 URINARY TRACT INFECTION, SITE NOT SPECIFIED: ICD-10-CM

## 2020-08-07 LAB
ALBUMIN SERPL ELPH-MCNC: 3.3 G/DL — SIGNIFICANT CHANGE UP (ref 3.3–5)
ALP SERPL-CCNC: 65 U/L — SIGNIFICANT CHANGE UP (ref 40–120)
ALT FLD-CCNC: 85 U/L — HIGH (ref 4–33)
ANION GAP SERPL CALC-SCNC: 13 MMO/L — SIGNIFICANT CHANGE UP (ref 7–14)
AST SERPL-CCNC: 13 U/L — SIGNIFICANT CHANGE UP (ref 4–32)
BILIRUB SERPL-MCNC: < 0.2 MG/DL — LOW (ref 0.2–1.2)
BUN SERPL-MCNC: 17 MG/DL — SIGNIFICANT CHANGE UP (ref 7–23)
C TRACH RRNA SPEC QL NAA+PROBE: SIGNIFICANT CHANGE UP
CALCIUM SERPL-MCNC: 9.1 MG/DL — SIGNIFICANT CHANGE UP (ref 8.4–10.5)
CHLORIDE SERPL-SCNC: 102 MMOL/L — SIGNIFICANT CHANGE UP (ref 98–107)
CO2 SERPL-SCNC: 20 MMOL/L — LOW (ref 22–31)
CREAT SERPL-MCNC: 0.75 MG/DL — SIGNIFICANT CHANGE UP (ref 0.5–1.3)
CULTURE RESULTS: NO GROWTH — SIGNIFICANT CHANGE UP
GLUCOSE SERPL-MCNC: 94 MG/DL — SIGNIFICANT CHANGE UP (ref 70–99)
HCT VFR BLD CALC: 33.2 % — LOW (ref 34.5–45)
HGB BLD-MCNC: 11.1 G/DL — LOW (ref 11.5–15.5)
MCHC RBC-ENTMCNC: 30.3 PG — SIGNIFICANT CHANGE UP (ref 27–34)
MCHC RBC-ENTMCNC: 33.4 % — SIGNIFICANT CHANGE UP (ref 32–36)
MCV RBC AUTO: 90.7 FL — SIGNIFICANT CHANGE UP (ref 80–100)
N GONORRHOEA RRNA SPEC QL NAA+PROBE: SIGNIFICANT CHANGE UP
NRBC # FLD: 0 K/UL — SIGNIFICANT CHANGE UP (ref 0–0)
PLATELET # BLD AUTO: 213 K/UL — SIGNIFICANT CHANGE UP (ref 150–400)
PMV BLD: 9.1 FL — SIGNIFICANT CHANGE UP (ref 7–13)
POTASSIUM SERPL-MCNC: 4.2 MMOL/L — SIGNIFICANT CHANGE UP (ref 3.5–5.3)
POTASSIUM SERPL-SCNC: 4.2 MMOL/L — SIGNIFICANT CHANGE UP (ref 3.5–5.3)
PROT SERPL-MCNC: 6.3 G/DL — SIGNIFICANT CHANGE UP (ref 6–8.3)
RBC # BLD: 3.66 M/UL — LOW (ref 3.8–5.2)
RBC # FLD: 13.2 % — SIGNIFICANT CHANGE UP (ref 10.3–14.5)
SODIUM SERPL-SCNC: 135 MMOL/L — SIGNIFICANT CHANGE UP (ref 135–145)
SPECIMEN SOURCE: SIGNIFICANT CHANGE UP
SPECIMEN SOURCE: SIGNIFICANT CHANGE UP
WBC # BLD: 8.47 K/UL — SIGNIFICANT CHANGE UP (ref 3.8–10.5)
WBC # FLD AUTO: 8.47 K/UL — SIGNIFICANT CHANGE UP (ref 3.8–10.5)

## 2020-08-07 PROCEDURE — 74177 CT ABD & PELVIS W/CONTRAST: CPT | Mod: 26

## 2020-08-07 PROCEDURE — 99232 SBSQ HOSP IP/OBS MODERATE 35: CPT

## 2020-08-07 RX ORDER — IBUPROFEN 200 MG
600 TABLET ORAL EVERY 6 HOURS
Refills: 0 | Status: DISCONTINUED | OUTPATIENT
Start: 2020-08-07 | End: 2020-08-07

## 2020-08-07 RX ORDER — NITROFURANTOIN MACROCRYSTAL 50 MG
1 CAPSULE ORAL
Qty: 28 | Refills: 0
Start: 2020-08-07 | End: 2020-08-20

## 2020-08-07 RX ORDER — ACETAMINOPHEN 500 MG
975 TABLET ORAL EVERY 6 HOURS
Refills: 0 | Status: DISCONTINUED | OUTPATIENT
Start: 2020-08-07 | End: 2020-08-07

## 2020-08-07 RX ORDER — ONDANSETRON 8 MG/1
4 TABLET, FILM COATED ORAL EVERY 4 HOURS
Refills: 0 | Status: DISCONTINUED | OUTPATIENT
Start: 2020-08-07 | End: 2020-08-07

## 2020-08-07 RX ADMIN — Medication 100 GRAM(S): at 09:20

## 2020-08-07 RX ADMIN — Medication 500 MILLIGRAM(S): at 06:30

## 2020-08-07 RX ADMIN — ONDANSETRON 4 MILLIGRAM(S): 8 TABLET, FILM COATED ORAL at 08:34

## 2020-08-07 RX ADMIN — Medication 975 MILLIGRAM(S): at 12:59

## 2020-08-07 RX ADMIN — Medication 15 MILLIGRAM(S): at 06:03

## 2020-08-07 RX ADMIN — Medication 15 MILLIGRAM(S): at 01:00

## 2020-08-07 RX ADMIN — Medication 15 MILLIGRAM(S): at 00:27

## 2020-08-07 RX ADMIN — Medication 600 MILLIGRAM(S): at 12:59

## 2020-08-07 RX ADMIN — SODIUM CHLORIDE 125 MILLILITER(S): 9 INJECTION, SOLUTION INTRAVENOUS at 08:35

## 2020-08-07 RX ADMIN — Medication 15 MILLIGRAM(S): at 06:30

## 2020-08-07 RX ADMIN — Medication 100 MILLIGRAM(S): at 06:30

## 2020-08-07 RX ADMIN — Medication 400 MILLIGRAM(S): at 01:21

## 2020-08-07 RX ADMIN — Medication 1000 MILLIGRAM(S): at 01:50

## 2020-08-07 NOTE — DISCHARGE NOTE NURSING/CASE MANAGEMENT/SOCIAL WORK - PATIENT PORTAL LINK FT
You can access the FollowMyHealth Patient Portal offered by [facility] by registering at the following website: [email or web address]. By joining [employer]’s FollowMyHealth portal, you will also be able to view your health information using other applications (apps) compatible with our system.

## 2020-08-07 NOTE — DISCHARGE NOTE PROVIDER - CARE PROVIDER_API CALL
Jes Contreras  OBSTETRICS AND GYNECOLOGY  [address] [place] [address]  Phone: [phone]  Fax: [phone]  Follow Up Time: 2 weeks

## 2020-08-07 NOTE — PROGRESS NOTE ADULT - SUBJECTIVE AND OBJECTIVE BOX
Patient is a 34y old  Female who presents with a chief complaint of Pelvic pain with underlying infection (07 Aug 2020 12:20)      SUBJECTIVE / OVERNIGHT EVENTS:  No events overnight. No nausea or vomiting. Pain well controlled    ADDITIONAL REVIEW OF SYSTEMS:  No chest pain, shortness of breath, cough.     MEDICATIONS  (STANDING):  acetaminophen   Tablet .. 975 milliGRAM(s) Oral every 6 hours  cefoTEtan  IVPB 2 Gram(s) IV Intermittent every 12 hours  doxycycline hyclate Capsule 100 milliGRAM(s) Oral every 12 hours  ibuprofen  Tablet. 600 milliGRAM(s) Oral every 6 hours  lactated ringers. 1000 milliLiter(s) (125 mL/Hr) IV Continuous <Continuous>  metroNIDAZOLE    Tablet 500 milliGRAM(s) Oral every 12 hours    MEDICATIONS  (PRN):  ondansetron Injectable 4 milliGRAM(s) IV Push every 4 hours PRN Nausea and/or Vomiting      CAPILLARY BLOOD GLUCOSE        I&O's Summary    06 Aug 2020 07:  -  07 Aug 2020 07:00  --------------------------------------------------------  IN: 1750 mL / OUT: 300 mL / NET: 1450 mL    07 Aug 2020 07:  -  07 Aug 2020 12:54  --------------------------------------------------------  IN: 550 mL / OUT: 0 mL / NET: 550 mL        PHYSICAL EXAM:  Vital Signs Last 24 Hrs  T(C): 37.1 (07 Aug 2020 11:23), Max: 37.1 (07 Aug 2020 11:23)  T(F): 98.7 (07 Aug 2020 11:23), Max: 98.7 (07 Aug 2020 11:23)  HR: 53 (07 Aug 2020 11:23) (53 - 82)  BP: 114/72 (07 Aug 2020 11:23) (109/51 - 125/71)  BP(mean): --  RR: 18 (07 Aug 2020 11:23) (16 - 18)  SpO2: 100% (07 Aug 2020 11:23) (96% - 100%)  CONSTITUTIONAL: NAD, well-developed, well-groomed  EYES: PERRLA; conjunctiva and sclera clear  ENMT: Moist oral mucosa, no pharyngeal injection or exudates; normal dentition  NECK: Supple, no palpable masses; no thyromegaly  RESPIRATORY: Normal respiratory effort; lungs are clear to auscultation bilaterally  CARDIOVASCULAR: Regular rate and rhythm, normal S1 and S2, no murmur/rub/gallop; No lower extremity edema; Peripheral pulses are 2+ bilaterally  ABDOMEN: Nontender to palpation, normoactive bowel sounds, no rebound/guarding; No hepatosplenomegaly  MUSCULOSKELETAL:  Normal gait; no clubbing or cyanosis of digits; no joint swelling or tenderness to palpation  PSYCH: A+O to person, place, and time; affect appropriate  NEUROLOGY: CN 2-12 are intact and symmetric; no gross sensory deficits   SKIN: No rashes; no palpable lesions    LABS:                        11.1   8.47  )-----------( 213      ( 07 Aug 2020 06:14 )             33.2     08-07    135  |  102  |  17  ----------------------------<  94  4.2   |  20<L>  |  0.75    Ca    9.1      07 Aug 2020 06:14    TPro  6.3  /  Alb  3.3  /  TBili  < 0.2<L>  /  DBili  x   /  AST  13  /  ALT  85<H>  /  AlkPhos  65  08-07          Urinalysis Basic - ( 06 Aug 2020 07:30 )    Color: YELLOW / Appearance: Lt TURBID / S.020 / pH: 6.0  Gluc: NEGATIVE / Ketone: NEGATIVE  / Bili: NEGATIVE / Urobili: NORMAL   Blood: LARGE / Protein: 30 / Nitrite: NEGATIVE   Leuk Esterase: LARGE / RBC: >50 / WBC >50   Sq Epi: FEW / Non Sq Epi: x / Bacteria: FEW        Culture - Blood (collected 06 Aug 2020 02:20)  Source: .Blood Blood-Venous  Preliminary Report (07 Aug 2020 03:02):    No growth to date.    Culture - Blood (collected 06 Aug 2020 02:20)  Source: .Blood Blood-Peripheral  Preliminary Report (07 Aug 2020 03:02):    No growth to date.    Culture - Urine (collected 05 Aug 2020 19:30)  Source: .Urine Clean Catch (Midstream)  Final Report (07 Aug 2020 00:32):    No growth        RADIOLOGY & ADDITIONAL TESTS:  Results Reviewed:   Imaging Personally Reviewed:  Electrocardiogram Personally Reviewed:    COORDINATION OF CARE:  Care Discussed with Consultants/Other Providers [Y/N]:  Prior or Outpatient Records Reviewed [Y/N]:

## 2020-08-07 NOTE — PROGRESS NOTE ADULT - PROBLEM SELECTOR PLAN 1
Neuro: Tylenol and Motrin po for pain control  Cards: HD stable  GI: reg diet, antiemetic prn. transaminitis improved f/u hepatitis panel  ID: c/w Cefotetan, Doxy, Flagyl. no leukocytosis. f/u urine gc/ct, HIV, RPR  : straight cath concerning for UTI with RBC noted. ? passed stone. f/u CT AP to evaluate for pyelo and kidney stones. Cr improved.    MANDI Villegas PGY-3

## 2020-08-07 NOTE — DISCHARGE NOTE PROVIDER - HOSPITAL COURSE
34y   admitted for pelvic pain and dysuria x4days. Exam on admission notable for cervical motion tenderness and diffuse low pelvic tenderness. Mild Leukocytosis on CBC with clinic picture concerning for infection UTI vs PID so empiric antibiotics were initiated. TVUS showing complex left adnexal structure as described, favoring hemorrhagic cyst, repeat imaging CTAP notable for thickened bladder wall consistent with clinical picture of cystitis and unchanged likely l adnexal hemorrhagic cyst. Pt is currently stable and emperic antibiotics will be discontinued. Pt will go home with 14 day course of macrobid.

## 2020-08-07 NOTE — PROGRESS NOTE ADULT - SUBJECTIVE AND OBJECTIVE BOX
R3 GYN Note    Patient seen and examined at bedside, no acute overnight events. Patient has some nausea this AM. Pain well controlled. Patient is ambulating, passing flatus, voiding spontaneously, and tolerating regular diet. Denies CP, SOB, N/V, fevers, and chills. She continues to have dysuria.    Vital Signs Last 24 Hours  T(C): 36.9 (08-07-20 @ 05:58), Max: 36.9 (08-07-20 @ 05:58)  HR: 67 (08-07-20 @ 05:58) (60 - 88)  BP: 125/71 (08-07-20 @ 05:58) (108/62 - 125/71)  RR: 16 (08-07-20 @ 05:58) (16 - 18)  SpO2: 96% (08-07-20 @ 05:58) (96% - 100%)    I&O's Detail    06 Aug 2020 07:01  -  07 Aug 2020 07:00  --------------------------------------------------------  IN:    IV PiggyBack: 250 mL    lactated ringers.: 1500 mL  Total IN: 1750 mL    OUT:  Voided: 300 mL  Total OUT: 300 mL  Total NET: 1450 mL    Physical Exam:  General: NAD  CV: NR, RR, S1, S2, no M/R/G  Lungs: CTA-B  Abdomen: Soft, non-tender this AM, non-distended, +BS  No CVA tenderness  Ext: No pain or swelling    Labs:             11.1<L>  8.47  )-----------( 213      ( 08-07 @ 06:14 )             33.2<L>               11.4<L>  9.99  )-----------( 203      ( 08-06 @ 05:30 )             33.7<L>               12.3   13.53<H> )-----------( 233      ( 08-05 @ 19:20 )             37.4     MEDICATIONS  (STANDING):  cefoTEtan  IVPB 2 Gram(s) IV Intermittent every 12 hours  doxycycline hyclate Capsule 100 milliGRAM(s) Oral every 12 hours  ketorolac   Injectable 15 milliGRAM(s) IV Push every 6 hours  lactated ringers. 1000 milliLiter(s) (125 mL/Hr) IV Continuous <Continuous>  metroNIDAZOLE    Tablet 500 milliGRAM(s) Oral every 12 hours    MEDICATIONS  (PRN):  ondansetron Injectable 4 milliGRAM(s) IV Push every 4 hours PRN Nausea and/or Vomiting

## 2020-08-07 NOTE — DISCHARGE NOTE PROVIDER - NSDCMRMEDTOKEN_GEN_ALL_CORE_FT
acetaminophen 500 mg oral tablet: 1 tab(s) orally every 8 hours, As Needed -for moderate pain    mg oral tablet: 1 tab(s) orally every 8 hours, As Needed -for moderate pain   Macrobid 100 mg oral capsule: 1 cap(s) orally every 12 hours   ondansetron 8 mg oral tablet: 1 tab(s) orally 2 times a day as needed for nausea  oxyCODONE 5 mg oral capsule: 1 cap(s) orally every 6 hours MDD:20mg  oxyCODONE 5 mg oral tablet: 1 tab(s) orally every 6 hours MDD:4

## 2020-08-07 NOTE — PROGRESS NOTE ADULT - PROBLEM SELECTOR PLAN 1
management as per GYN - on broad spectrum antibiotics, IVFs, pain control  UTI - f/u Ucx. f/u CT abd  - GC/Chlamydia, HIV pending

## 2020-08-08 LAB
HAV IGM SER-ACNC: NONREACTIVE — SIGNIFICANT CHANGE UP
HBV CORE IGM SER-ACNC: NONREACTIVE — SIGNIFICANT CHANGE UP
HBV SURFACE AG SER-ACNC: NONREACTIVE — SIGNIFICANT CHANGE UP
HCV AB S/CO SERPL IA: 0.09 S/CO — SIGNIFICANT CHANGE UP (ref 0–0.99)
HCV AB SERPL-IMP: SIGNIFICANT CHANGE UP
HIV 1+2 AB+HIV1 P24 AG SERPL QL IA: SIGNIFICANT CHANGE UP

## 2020-08-11 LAB
CULTURE RESULTS: SIGNIFICANT CHANGE UP
CULTURE RESULTS: SIGNIFICANT CHANGE UP
SPECIMEN SOURCE: SIGNIFICANT CHANGE UP
SPECIMEN SOURCE: SIGNIFICANT CHANGE UP

## 2020-08-18 ENCOUNTER — EMERGENCY (EMERGENCY)
Facility: HOSPITAL | Age: 35
LOS: 1 days | Discharge: HOME | End: 2020-08-18
Attending: STUDENT IN AN ORGANIZED HEALTH CARE EDUCATION/TRAINING PROGRAM
Payer: MEDICAID

## 2020-08-18 VITALS
HEART RATE: 74 BPM | WEIGHT: 179.9 LBS | DIASTOLIC BLOOD PRESSURE: 86 MMHG | HEIGHT: 64 IN | TEMPERATURE: 98 F | OXYGEN SATURATION: 99 % | SYSTOLIC BLOOD PRESSURE: 143 MMHG | RESPIRATION RATE: 20 BRPM

## 2020-08-18 VITALS — HEIGHT: 64 IN | WEIGHT: 180.34 LBS

## 2020-08-18 DIAGNOSIS — Z98.89 OTHER SPECIFIED POSTPROCEDURAL STATES: Chronic | ICD-10-CM

## 2020-08-18 PROCEDURE — 99283 EMERGENCY DEPT VISIT LOW MDM: CPT

## 2020-08-18 RX ORDER — ACETAMINOPHEN 500 MG
2 TABLET ORAL
Qty: 60 | Refills: 0
Start: 2020-08-18 | End: 2020-08-27

## 2020-08-18 RX ORDER — AMOXICILLIN 250 MG/5ML
1 SUSPENSION, RECONSTITUTED, ORAL (ML) ORAL
Qty: 20 | Refills: 0
Start: 2020-08-18 | End: 2020-08-27

## 2020-08-18 RX ORDER — DEXAMETHASONE 0.5 MG/5ML
10 ELIXIR ORAL ONCE
Refills: 0 | Status: COMPLETED | OUTPATIENT
Start: 2020-08-18 | End: 2020-08-18

## 2020-08-18 RX ORDER — AMOXICILLIN 250 MG/5ML
500 SUSPENSION, RECONSTITUTED, ORAL (ML) ORAL ONCE
Refills: 0 | Status: COMPLETED | OUTPATIENT
Start: 2020-08-18 | End: 2020-08-18

## 2020-08-18 RX ORDER — ACETAMINOPHEN 500 MG
650 TABLET ORAL ONCE
Refills: 0 | Status: COMPLETED | OUTPATIENT
Start: 2020-08-18 | End: 2020-08-18

## 2020-08-18 RX ADMIN — Medication 650 MILLIGRAM(S): at 06:55

## 2020-08-18 RX ADMIN — Medication 10 MILLIGRAM(S): at 06:56

## 2020-08-18 RX ADMIN — Medication 500 MILLIGRAM(S): at 06:55

## 2020-08-18 NOTE — ED PROVIDER NOTE - PATIENT PORTAL LINK FT
You can access the FollowMyHealth Patient Portal offered by [facility] by registering at the following website: [email or web address]. By joining [employer]’s FollowMyHealth portal, you will also be able to view your health information using other applications (apps) compatible with our system.

## 2020-08-18 NOTE — ED ADULT NURSE NOTE - OBJECTIVE STATEMENT
35 y/o female aao3 speaking in clear and full sentences with notable discomfort presenting with throat pan over the last 2 days, denies chills, denies cough, denies sick contacts. upon inspection tonsils are edematous, erythremic and hyperplastic, almost touching each other. patient took oral analgesia around 4am, 5 motrin 200mg oral.

## 2020-08-18 NOTE — ED PROVIDER NOTE - THROAT FINDINGS
bilateral tonsillar hypertrophy 4+ w/ uvula midline, no drooling, no trismus able to open mouth w/out difficulty

## 2020-08-18 NOTE — ED PROVIDER NOTE - NSFOLLOWUPCLINICS_GEN_ALL_ED_FT
[facility] ENT  ENT  [address], [address]  [place] [address]  Phone: [phone]  Fax:   Follow Up Time: Routine

## 2020-08-18 NOTE — ED PROVIDER NOTE - OBJECTIVE STATEMENT
34 F w/ PMH of PID presents to the ED w/ sore throat for 2 days. No chest pain. no shortness of breath, no nausea no vomiting. Pt reports taking motrin every 2-3 hours for throat pain in the past 24 hours. Pt denies any neck stiffness.

## 2020-08-18 NOTE — ED PROVIDER NOTE - NSFOLLOWUPINSTRUCTIONS_ED_ALL_ED_FT
You were seen in the emergency department today for tonsilitis. We recommend that you take amoxicillin 500 mg twice a day for 10 days. We recommend that you return to for vomiting, inability to tolerate oral liquids, fever or any other concerning symptoms.     Please follow up with your ENT over the next 5-7 days for your throat pain.      We recommend that you take tylenol(650 mg up to three times a day)/motrin(600 mg up to three times a day) for your symptoms.

## 2020-08-18 NOTE — ED PROVIDER NOTE - CLINICAL SUMMARY MEDICAL DECISION MAKING FREE TEXT BOX
34 F w/ recent dx and treatment for PID presents to the ED w/ 2 days of sorethroat. no fevers. Pt states that she has been taking motrin for symptoms. on exam, pt is nontoxic appearing has significant tonsilar hypertrophy w/ no exudates. no neck stiffness. No drooling. Pt findings c/w tonsilitis- viral vs bacteria. pt has no findings c/w gonorrhea (last oral sex was 3 weeks ago and recently received tx'mt for PID) Plan to dc w/ antibiotics

## 2020-08-18 NOTE — ED PROVIDER NOTE - NS ED ROS FT
Constitutional: no fevers no chills.   HEENT: + throat pain, no stridor  CV: no chest pain, no palpitations   Respiratory: no shortness of breath, no cough   GI: no abdominal pain, no nausea no vomiting   Neuro: no headache, no weakness, no numbness

## 2020-08-20 DIAGNOSIS — Z90.89 ACQUIRED ABSENCE OF OTHER ORGANS: ICD-10-CM

## 2020-08-20 DIAGNOSIS — Z90.49 ACQUIRED ABSENCE OF OTHER SPECIFIED PARTS OF DIGESTIVE TRACT: ICD-10-CM

## 2020-08-20 DIAGNOSIS — F17.200 NICOTINE DEPENDENCE, UNSPECIFIED, UNCOMPLICATED: ICD-10-CM

## 2020-08-20 DIAGNOSIS — J03.90 ACUTE TONSILLITIS, UNSPECIFIED: ICD-10-CM

## 2020-08-20 DIAGNOSIS — J02.9 ACUTE PHARYNGITIS, UNSPECIFIED: ICD-10-CM

## 2020-08-20 DIAGNOSIS — Z98.890 OTHER SPECIFIED POSTPROCEDURAL STATES: ICD-10-CM

## 2020-11-19 ENCOUNTER — EMERGENCY (EMERGENCY)
Facility: HOSPITAL | Age: 35
LOS: 1 days | Discharge: HOME | End: 2020-11-19
Attending: HOSPITALIST | Admitting: HOSPITALIST
Payer: MEDICAID

## 2020-11-19 VITALS
RESPIRATION RATE: 17 BRPM | TEMPERATURE: 98 F | SYSTOLIC BLOOD PRESSURE: 119 MMHG | DIASTOLIC BLOOD PRESSURE: 75 MMHG | OXYGEN SATURATION: 100 % | HEART RATE: 64 BPM

## 2020-11-19 VITALS
SYSTOLIC BLOOD PRESSURE: 147 MMHG | OXYGEN SATURATION: 100 % | TEMPERATURE: 98 F | HEIGHT: 64 IN | DIASTOLIC BLOOD PRESSURE: 94 MMHG | RESPIRATION RATE: 18 BRPM | HEART RATE: 104 BPM

## 2020-11-19 DIAGNOSIS — Z98.89 OTHER SPECIFIED POSTPROCEDURAL STATES: Chronic | ICD-10-CM

## 2020-11-19 LAB
ALBUMIN SERPL ELPH-MCNC: 4.1 G/DL — SIGNIFICANT CHANGE UP (ref 3.3–5)
ALP SERPL-CCNC: 43 U/L — SIGNIFICANT CHANGE UP (ref 40–120)
ALT FLD-CCNC: 13 U/L — SIGNIFICANT CHANGE UP (ref 4–33)
ANION GAP SERPL CALC-SCNC: 9 MMO/L — SIGNIFICANT CHANGE UP (ref 7–14)
APPEARANCE UR: CLEAR — SIGNIFICANT CHANGE UP
AST SERPL-CCNC: 27 U/L — SIGNIFICANT CHANGE UP (ref 4–32)
BACTERIA # UR AUTO: HIGH
BASE EXCESS BLDV CALC-SCNC: 1 MMOL/L — SIGNIFICANT CHANGE UP
BASOPHILS # BLD AUTO: 0.07 K/UL — SIGNIFICANT CHANGE UP (ref 0–0.2)
BASOPHILS NFR BLD AUTO: 0.7 % — SIGNIFICANT CHANGE UP (ref 0–2)
BILIRUB SERPL-MCNC: 0.3 MG/DL — SIGNIFICANT CHANGE UP (ref 0.2–1.2)
BILIRUB UR-MCNC: NEGATIVE — SIGNIFICANT CHANGE UP
BLOOD GAS VENOUS - CREATININE: 0.97 MG/DL — SIGNIFICANT CHANGE UP (ref 0.5–1.3)
BLOOD UR QL VISUAL: NEGATIVE — SIGNIFICANT CHANGE UP
BUN SERPL-MCNC: 15 MG/DL — SIGNIFICANT CHANGE UP (ref 7–23)
CALCIUM SERPL-MCNC: 9.5 MG/DL — SIGNIFICANT CHANGE UP (ref 8.4–10.5)
CHLORIDE BLDV-SCNC: 109 MMOL/L — HIGH (ref 96–108)
CHLORIDE SERPL-SCNC: 101 MMOL/L — SIGNIFICANT CHANGE UP (ref 98–107)
CO2 SERPL-SCNC: 24 MMOL/L — SIGNIFICANT CHANGE UP (ref 22–31)
COLOR SPEC: YELLOW — SIGNIFICANT CHANGE UP
CREAT SERPL-MCNC: 0.94 MG/DL — SIGNIFICANT CHANGE UP (ref 0.5–1.3)
EOSINOPHIL # BLD AUTO: 0.41 K/UL — SIGNIFICANT CHANGE UP (ref 0–0.5)
EOSINOPHIL NFR BLD AUTO: 4.1 % — SIGNIFICANT CHANGE UP (ref 0–6)
GAS PNL BLDV: 133 MMOL/L — LOW (ref 136–146)
GLUCOSE BLDV-MCNC: 103 MG/DL — HIGH (ref 70–99)
GLUCOSE SERPL-MCNC: 107 MG/DL — HIGH (ref 70–99)
GLUCOSE UR-MCNC: NEGATIVE — SIGNIFICANT CHANGE UP
HCO3 BLDV-SCNC: 23 MMOL/L — SIGNIFICANT CHANGE UP (ref 20–27)
HCT VFR BLD CALC: 38.8 % — SIGNIFICANT CHANGE UP (ref 34.5–45)
HCT VFR BLDV CALC: 39.4 % — SIGNIFICANT CHANGE UP (ref 34.5–45)
HGB BLD-MCNC: 12.2 G/DL — SIGNIFICANT CHANGE UP (ref 11.5–15.5)
HGB BLDV-MCNC: 12.8 G/DL — SIGNIFICANT CHANGE UP (ref 11.5–15.5)
HYALINE CASTS # UR AUTO: SIGNIFICANT CHANGE UP
IMM GRANULOCYTES NFR BLD AUTO: 0.4 % — SIGNIFICANT CHANGE UP (ref 0–1.5)
KETONES UR-MCNC: NEGATIVE — SIGNIFICANT CHANGE UP
LACTATE BLDV-MCNC: 0.9 MMOL/L — SIGNIFICANT CHANGE UP (ref 0.5–2)
LEUKOCYTE ESTERASE UR-ACNC: SIGNIFICANT CHANGE UP
LIDOCAIN IGE QN: 29.2 U/L — SIGNIFICANT CHANGE UP (ref 7–60)
LYMPHOCYTES # BLD AUTO: 2.16 K/UL — SIGNIFICANT CHANGE UP (ref 1–3.3)
LYMPHOCYTES # BLD AUTO: 21.5 % — SIGNIFICANT CHANGE UP (ref 13–44)
MCHC RBC-ENTMCNC: 28.6 PG — SIGNIFICANT CHANGE UP (ref 27–34)
MCHC RBC-ENTMCNC: 31.4 % — LOW (ref 32–36)
MCV RBC AUTO: 90.9 FL — SIGNIFICANT CHANGE UP (ref 80–100)
MONOCYTES # BLD AUTO: 0.91 K/UL — HIGH (ref 0–0.9)
MONOCYTES NFR BLD AUTO: 9.1 % — SIGNIFICANT CHANGE UP (ref 2–14)
NEUTROPHILS # BLD AUTO: 6.44 K/UL — SIGNIFICANT CHANGE UP (ref 1.8–7.4)
NEUTROPHILS NFR BLD AUTO: 64.2 % — SIGNIFICANT CHANGE UP (ref 43–77)
NITRITE UR-MCNC: NEGATIVE — SIGNIFICANT CHANGE UP
NRBC # FLD: 0 K/UL — SIGNIFICANT CHANGE UP (ref 0–0)
PCO2 BLDV: 49 MMHG — SIGNIFICANT CHANGE UP (ref 41–51)
PH BLDV: 7.35 PH — SIGNIFICANT CHANGE UP (ref 7.32–7.43)
PH UR: 6 — SIGNIFICANT CHANGE UP (ref 5–8)
PLATELET # BLD AUTO: 218 K/UL — SIGNIFICANT CHANGE UP (ref 150–400)
PMV BLD: 9 FL — SIGNIFICANT CHANGE UP (ref 7–13)
PO2 BLDV: < 24 MMHG — LOW (ref 35–40)
POTASSIUM BLDV-SCNC: 4.7 MMOL/L — HIGH (ref 3.4–4.5)
POTASSIUM SERPL-MCNC: 5.4 MMOL/L — HIGH (ref 3.5–5.3)
POTASSIUM SERPL-SCNC: 5.4 MMOL/L — HIGH (ref 3.5–5.3)
PROT SERPL-MCNC: 7.9 G/DL — SIGNIFICANT CHANGE UP (ref 6–8.3)
PROT UR-MCNC: 20 — SIGNIFICANT CHANGE UP
RBC # BLD: 4.27 M/UL — SIGNIFICANT CHANGE UP (ref 3.8–5.2)
RBC # FLD: 14.6 % — HIGH (ref 10.3–14.5)
RBC CASTS # UR COMP ASSIST: SIGNIFICANT CHANGE UP (ref 0–?)
SAO2 % BLDV: 30.6 % — LOW (ref 60–85)
SODIUM SERPL-SCNC: 134 MMOL/L — LOW (ref 135–145)
SP GR SPEC: 1.02 — SIGNIFICANT CHANGE UP (ref 1–1.04)
SQUAMOUS # UR AUTO: SIGNIFICANT CHANGE UP
UROBILINOGEN FLD QL: NORMAL — SIGNIFICANT CHANGE UP
WBC # BLD: 10.03 K/UL — SIGNIFICANT CHANGE UP (ref 3.8–10.5)
WBC # FLD AUTO: 10.03 K/UL — SIGNIFICANT CHANGE UP (ref 3.8–10.5)
WBC UR QL: HIGH (ref 0–?)

## 2020-11-19 PROCEDURE — 99284 EMERGENCY DEPT VISIT MOD MDM: CPT

## 2020-11-19 PROCEDURE — 76830 TRANSVAGINAL US NON-OB: CPT | Mod: 26

## 2020-11-19 RX ORDER — CEFPODOXIME PROXETIL 100 MG
1 TABLET ORAL
Qty: 14 | Refills: 0
Start: 2020-11-19 | End: 2020-11-25

## 2020-11-19 RX ORDER — IBUPROFEN 200 MG
600 TABLET ORAL ONCE
Refills: 0 | Status: COMPLETED | OUTPATIENT
Start: 2020-11-19 | End: 2020-11-19

## 2020-11-19 RX ORDER — ONDANSETRON 8 MG/1
4 TABLET, FILM COATED ORAL ONCE
Refills: 0 | Status: COMPLETED | OUTPATIENT
Start: 2020-11-19 | End: 2020-11-19

## 2020-11-19 RX ORDER — ACETAMINOPHEN 500 MG
650 TABLET ORAL ONCE
Refills: 0 | Status: COMPLETED | OUTPATIENT
Start: 2020-11-19 | End: 2020-11-19

## 2020-11-19 RX ORDER — OXYCODONE HYDROCHLORIDE 5 MG/1
1 TABLET ORAL
Qty: 4 | Refills: 0
Start: 2020-11-19 | End: 2020-11-22

## 2020-11-19 RX ORDER — ONDANSETRON 8 MG/1
1 TABLET, FILM COATED ORAL
Qty: 42 | Refills: 0
Start: 2020-11-19 | End: 2020-12-02

## 2020-11-19 RX ORDER — AZTREONAM 2 G
160 VIAL (EA) INJECTION
Qty: 1600 | Refills: 0
Start: 2020-11-19 | End: 2020-11-23

## 2020-11-19 RX ORDER — SODIUM CHLORIDE 9 MG/ML
1000 INJECTION INTRAMUSCULAR; INTRAVENOUS; SUBCUTANEOUS ONCE
Refills: 0 | Status: COMPLETED | OUTPATIENT
Start: 2020-11-19 | End: 2020-11-19

## 2020-11-19 RX ADMIN — Medication 600 MILLIGRAM(S): at 09:53

## 2020-11-19 RX ADMIN — ONDANSETRON 4 MILLIGRAM(S): 8 TABLET, FILM COATED ORAL at 06:28

## 2020-11-19 RX ADMIN — SODIUM CHLORIDE 1000 MILLILITER(S): 9 INJECTION INTRAMUSCULAR; INTRAVENOUS; SUBCUTANEOUS at 06:28

## 2020-11-19 RX ADMIN — Medication 650 MILLIGRAM(S): at 06:28

## 2020-11-19 RX ADMIN — ONDANSETRON 4 MILLIGRAM(S): 8 TABLET, FILM COATED ORAL at 08:43

## 2020-11-19 RX ADMIN — Medication 650 MILLIGRAM(S): at 07:00

## 2020-11-19 NOTE — ED PROVIDER NOTE - PATIENT PORTAL LINK FT
You can access the FollowMyHealth Patient Portal offered by [facility] by registering at the following website: [email or web address]. By joining "[employer]’s FollowMyHealth portal, you will also be able to view your health information using other applications (apps) compatible with our system.

## 2020-11-19 NOTE — ED PROVIDER NOTE - NS ED ROS FT
ROS:  GENERAL: No fever, no chills  EYES: no change in vision  HEENT: no trouble swallowing, no trouble speaking  CARDIAC: no chest pain  PULMONARY: no cough, no shortness of breath  GI: +abd pain, N/V.  no diarrhea, no constipation  : No dysuria, no frequency, no change in appearance, or odor of urine  SKIN: no rashes  NEURO: no headache, no weakness  MSK: No joint pain    Arcenio Kent PGY3

## 2020-11-19 NOTE — ED PROVIDER NOTE - ATTENDING CONTRIBUTION TO CARE
34F p/w LLQ abdominal/pelvic pain, nausea, and vomiting x 5 days. Went to an urgent care on Monday and dx with UTI, treated with keflex. Since then patient has been having worsening crampy LLQ pain + N/V. pain is constant and radiates to lower back.     ***GEN - NAD; well appearing; A+O x3 ***HEAD - NC/AT ***EYES/NOSE - PERRL, EOMI, mucous membranes moist, no discharge ***THROAT: Oral cavity and pharynx normal. No inflammation, swelling, exudate, or lesions.  ***NECK: Neck supple, non-tender without lymphadenopathy, no masses, no thyromegaly.   ***PULMONARY - CTA b/l, symmetric breath sounds. ***CARDIAC -s1s2, RRR, no M,G,R  ***ABDOMEN - +BS, ND, NT, soft, no guarding, no rebound, no masses   ***BACK - no CVA tenderness, Normal  spine ***EXTREMITIES - symmetric pulses, 2+ dp, capillary refill < 2 seconds, no clubbing, no cyanosis, no edema ***SKIN - no rash or bruising   ***NEUROLOGIC - alert, CN 2-12 intact      MDM: 34F with left pelvic pain. r/o torsion, ruptured cyst. labs with tvus, pain control

## 2020-11-19 NOTE — ED PROVIDER NOTE - NSFOLLOWUPCLINICS_GEN_ALL_ED_FT
[facility] Gynecology and Obstetrics  Gynceology/OB  [address] [place] [address]  Phone: [phone]  Fax:   Follow Up Time: 1-3 Days

## 2020-11-19 NOTE — ED PROVIDER NOTE - OBJECTIVE STATEMENT
35 y/o  female presents to the ER with LLQ abdominal/pelvic pain, nausea, and vomiting x 5 days. Went to an urgent care on Monday for dysuria at which time she was sent home on a five day course of Keflex for UTI (last dose today). Since then has developed the worsening crampy LLQ pain + N/V. Denies any other symptoms including fever, discharge, diarrhea, vaginal bleeding, chest pain, SOB, cough. Of note, was admitted in August for similar pelvic pain thought to be 2/2 ruptured hemorrhagic cyst vs. PID.

## 2020-11-19 NOTE — ED PROVIDER NOTE - NSFOLLOWUPINSTRUCTIONS_ED_ALL_ED_FT
You have a hemorrhagic ovarian cyst.     Please follow up with an OBGYN in the next few days. Please see attached contact above.    You may use tylenol for pain. Please do not exceed 3250 mg in 24 hours. You may use ibuprofen for pain. Please do not exceed 3000 mg in 24 hours. For breakthrough pain you may take oxycodone. Please heed medication warnings on the packaging     For nausea please  zofran at your pharmacy.    Return to the Emergency Department for worsening or persistent symptoms including abdominal pain despite pain medications, fever, persistent vomiting, unable to eat, and/or ANY NEW OR CONCERNING SYMPTOMS. If you have issues obtaining follow up, please call: [phone] to obtain a doctor or specialist who takes your insurance in your area.

## 2020-11-19 NOTE — ED ADULT NURSE NOTE - CHIEF COMPLAINT QUOTE
Pt st" I have severe abd pain started few days ago...was seen at Urgent care on Monday treated for UTI not getting better...have a lot of pain can't tell if its from front to back."

## 2020-11-19 NOTE — ED PROVIDER NOTE - PHYSICAL EXAMINATION
Gen: AAOx3, non-toxic  Head: NCAT  HEENT: EOMI, oral mucosa moist, normal conjunctiva  Lung: CTAB, no respiratory distress, no wheezes/rhonchi/rales B/L, speaking in full sentences  CV: RRR, no murmurs, rubs or gallops  Abd: soft, minimal LLQ TTP, +L CVA TTP, no reboud, no guarding  Pelvic: no adnexal TTP, no CMT   MSK: no visible deformities  Neuro: No focal sensory or motor deficits  Skin: Warm, well perfused, no rash  Psych: normal affect.     Arcenio Kent PGY3

## 2020-11-19 NOTE — ED PROVIDER NOTE - PROGRESS NOTE DETAILS
Joseph Frankel PGY2: Patient feeling better after zofran and pain meds. Offered obs for symptomatic treatment but patient declined. UA with some WBCs however patient just finished course of AB today for UTI and is no longer symptomatic. Will await culture. Will dc with OBGYN follow up. Discussed plan and return precautions with patient who understands and agrees. All questions answered. Joseph Frankel PGY2: When patient was given dc papers, told me that she actually was still having slight dysuria. Will give new prescription for UTI and told patient to call back for culture results. No concern for pyelonephritis at this time given no CVA tenderness but cefpodoxime will cover regardless. Patient is ok with plan, understands, and agrees.

## 2020-11-19 NOTE — ED PROVIDER NOTE - CLINICAL SUMMARY MEDICAL DECISION MAKING FREE TEXT BOX
LLQ abd pain + N/V in setting of recent UTI. Pt well appearing, HD stable, abd benign. Low suspicion for acute GI pathology based on exam and history. Will assess for adnexal pathology including torsion/PID/ectopic + UTI/STI. Pain meds and reassess.

## 2020-11-19 NOTE — ED ADULT NURSE NOTE - OBJECTIVE STATEMENT
Pt is A&OX4, ambulatory, c/o sharp, constant pain in her left flank and pelvic region that started Monday. Pt states nothing makes the pain worse or better.  Pt was seen at urgent care and treated for UTI. Pt denies any urinary frequency, pain, discomfort at this time. Pt endorses N/V. Denies SOB, fevers, chills, headache, diarrhea. Abdomen is round, soft, nontender. Skin is warm, dry, intact. Vitally stable. 20 gauge IV placed in left AC. Will continue to monitor.

## 2020-11-20 LAB
C TRACH RRNA SPEC QL NAA+PROBE: SIGNIFICANT CHANGE UP
CULTURE RESULTS: NO GROWTH — SIGNIFICANT CHANGE UP
N GONORRHOEA RRNA SPEC QL NAA+PROBE: SIGNIFICANT CHANGE UP
SPECIMEN SOURCE: SIGNIFICANT CHANGE UP
SPECIMEN SOURCE: SIGNIFICANT CHANGE UP

## 2020-11-30 ENCOUNTER — APPOINTMENT (OUTPATIENT)
Dept: OBGYN | Facility: HOSPITAL | Age: 35
End: 2020-11-30

## 2021-02-25 ENCOUNTER — EMERGENCY (EMERGENCY)
Facility: HOSPITAL | Age: 36
LOS: 1 days | Discharge: HOME | End: 2021-02-25
Attending: EMERGENCY MEDICINE
Payer: MEDICAID

## 2021-02-25 VITALS
OXYGEN SATURATION: 99 % | HEART RATE: 80 BPM | RESPIRATION RATE: 18 BRPM | DIASTOLIC BLOOD PRESSURE: 73 MMHG | WEIGHT: 179.9 LBS | TEMPERATURE: 99 F | SYSTOLIC BLOOD PRESSURE: 134 MMHG | HEIGHT: 64 IN

## 2021-02-25 VITALS
DIASTOLIC BLOOD PRESSURE: 87 MMHG | RESPIRATION RATE: 18 BRPM | OXYGEN SATURATION: 100 % | SYSTOLIC BLOOD PRESSURE: 141 MMHG | HEART RATE: 89 BPM | TEMPERATURE: 99 F

## 2021-02-25 DIAGNOSIS — Z98.89 OTHER SPECIFIED POSTPROCEDURAL STATES: Chronic | ICD-10-CM

## 2021-02-25 LAB
ALBUMIN SERPL ELPH-MCNC: 3.9 G/DL — SIGNIFICANT CHANGE UP (ref 3.3–5)
ALP SERPL-CCNC: 55 U/L — SIGNIFICANT CHANGE UP (ref 40–120)
ALT FLD-CCNC: 8 U/L — LOW (ref 10–45)
ANION GAP SERPL CALC-SCNC: 10 MMOL/L — SIGNIFICANT CHANGE UP (ref 5–17)
ANION GAP SERPL CALC-SCNC: 14 MMOL/L — SIGNIFICANT CHANGE UP (ref 5–17)
APPEARANCE UR: CLEAR — SIGNIFICANT CHANGE UP
AST SERPL-CCNC: 8 U/L — LOW (ref 10–40)
BACTERIA # UR AUTO: ABNORMAL
BASE EXCESS BLDV CALC-SCNC: -1.4 MMOL/L — SIGNIFICANT CHANGE UP (ref -2–2)
BASOPHILS # BLD AUTO: 0.06 K/UL — SIGNIFICANT CHANGE UP (ref 0–0.2)
BASOPHILS NFR BLD AUTO: 0.6 % — SIGNIFICANT CHANGE UP (ref 0–2)
BILIRUB SERPL-MCNC: 0.2 MG/DL — SIGNIFICANT CHANGE UP (ref 0.2–1.2)
BILIRUB UR-MCNC: NEGATIVE — SIGNIFICANT CHANGE UP
BUN SERPL-MCNC: 26 MG/DL — HIGH (ref 7–23)
BUN SERPL-MCNC: 26 MG/DL — HIGH (ref 7–23)
CA-I SERPL-SCNC: 1.18 MMOL/L — SIGNIFICANT CHANGE UP (ref 1.12–1.3)
CALCIUM SERPL-MCNC: 8.3 MG/DL — LOW (ref 8.4–10.5)
CALCIUM SERPL-MCNC: 9 MG/DL — SIGNIFICANT CHANGE UP (ref 8.4–10.5)
CHLORIDE BLDV-SCNC: 107 MMOL/L — SIGNIFICANT CHANGE UP (ref 96–108)
CHLORIDE SERPL-SCNC: 102 MMOL/L — SIGNIFICANT CHANGE UP (ref 96–108)
CHLORIDE SERPL-SCNC: 104 MMOL/L — SIGNIFICANT CHANGE UP (ref 96–108)
CO2 BLDV-SCNC: 26 MMOL/L — SIGNIFICANT CHANGE UP (ref 22–30)
CO2 SERPL-SCNC: 20 MMOL/L — LOW (ref 22–31)
CO2 SERPL-SCNC: 21 MMOL/L — LOW (ref 22–31)
COLOR SPEC: YELLOW — SIGNIFICANT CHANGE UP
CREAT SERPL-MCNC: 0.94 MG/DL — SIGNIFICANT CHANGE UP (ref 0.5–1.3)
CREAT SERPL-MCNC: 1.02 MG/DL — SIGNIFICANT CHANGE UP (ref 0.5–1.3)
DIFF PNL FLD: NEGATIVE — SIGNIFICANT CHANGE UP
EOSINOPHIL # BLD AUTO: 0.41 K/UL — SIGNIFICANT CHANGE UP (ref 0–0.5)
EOSINOPHIL NFR BLD AUTO: 3.8 % — SIGNIFICANT CHANGE UP (ref 0–6)
EPI CELLS # UR: 8 /HPF — HIGH
GAS PNL BLDV: 135 MMOL/L — SIGNIFICANT CHANGE UP (ref 135–145)
GAS PNL BLDV: SIGNIFICANT CHANGE UP
GLUCOSE BLDV-MCNC: 90 MG/DL — SIGNIFICANT CHANGE UP (ref 70–99)
GLUCOSE SERPL-MCNC: 93 MG/DL — SIGNIFICANT CHANGE UP (ref 70–99)
GLUCOSE SERPL-MCNC: 94 MG/DL — SIGNIFICANT CHANGE UP (ref 70–99)
GLUCOSE UR QL: NEGATIVE — SIGNIFICANT CHANGE UP
HCG UR QL: NEGATIVE — SIGNIFICANT CHANGE UP
HCO3 BLDV-SCNC: 24 MMOL/L — SIGNIFICANT CHANGE UP (ref 21–29)
HCT VFR BLD CALC: 35.2 % — SIGNIFICANT CHANGE UP (ref 34.5–45)
HCT VFR BLDA CALC: 37 % — LOW (ref 39–50)
HGB BLD CALC-MCNC: 12.1 G/DL — SIGNIFICANT CHANGE UP (ref 11.5–15.5)
HGB BLD-MCNC: 11.3 G/DL — LOW (ref 11.5–15.5)
HYALINE CASTS # UR AUTO: 2 /LPF — SIGNIFICANT CHANGE UP (ref 0–2)
IMM GRANULOCYTES NFR BLD AUTO: 0.4 % — SIGNIFICANT CHANGE UP (ref 0–1.5)
KETONES UR-MCNC: SIGNIFICANT CHANGE UP
LACTATE BLDV-MCNC: 0.5 MMOL/L — LOW (ref 0.7–2)
LEUKOCYTE ESTERASE UR-ACNC: NEGATIVE — SIGNIFICANT CHANGE UP
LYMPHOCYTES # BLD AUTO: 2.65 K/UL — SIGNIFICANT CHANGE UP (ref 1–3.3)
LYMPHOCYTES # BLD AUTO: 24.4 % — SIGNIFICANT CHANGE UP (ref 13–44)
MCHC RBC-ENTMCNC: 29.3 PG — SIGNIFICANT CHANGE UP (ref 27–34)
MCHC RBC-ENTMCNC: 32.1 GM/DL — SIGNIFICANT CHANGE UP (ref 32–36)
MCV RBC AUTO: 91.2 FL — SIGNIFICANT CHANGE UP (ref 80–100)
MONOCYTES # BLD AUTO: 1.04 K/UL — HIGH (ref 0–0.9)
MONOCYTES NFR BLD AUTO: 9.6 % — SIGNIFICANT CHANGE UP (ref 2–14)
NEUTROPHILS # BLD AUTO: 6.65 K/UL — SIGNIFICANT CHANGE UP (ref 1.8–7.4)
NEUTROPHILS NFR BLD AUTO: 61.2 % — SIGNIFICANT CHANGE UP (ref 43–77)
NITRITE UR-MCNC: NEGATIVE — SIGNIFICANT CHANGE UP
NRBC # BLD: 0 /100 WBCS — SIGNIFICANT CHANGE UP (ref 0–0)
PCO2 BLDV: 47 MMHG — SIGNIFICANT CHANGE UP (ref 35–50)
PH BLDV: 7.33 — LOW (ref 7.35–7.45)
PH UR: 6 — SIGNIFICANT CHANGE UP (ref 5–8)
PLATELET # BLD AUTO: 200 K/UL — SIGNIFICANT CHANGE UP (ref 150–400)
PO2 BLDV: 25 MMHG — SIGNIFICANT CHANGE UP (ref 25–45)
POTASSIUM BLDV-SCNC: 3.9 MMOL/L — SIGNIFICANT CHANGE UP (ref 3.5–5.3)
POTASSIUM SERPL-MCNC: 3.7 MMOL/L — SIGNIFICANT CHANGE UP (ref 3.5–5.3)
POTASSIUM SERPL-MCNC: 4.1 MMOL/L — SIGNIFICANT CHANGE UP (ref 3.5–5.3)
POTASSIUM SERPL-SCNC: 3.7 MMOL/L — SIGNIFICANT CHANGE UP (ref 3.5–5.3)
POTASSIUM SERPL-SCNC: 4.1 MMOL/L — SIGNIFICANT CHANGE UP (ref 3.5–5.3)
PROT SERPL-MCNC: 7.2 G/DL — SIGNIFICANT CHANGE UP (ref 6–8.3)
PROT UR-MCNC: ABNORMAL
RBC # BLD: 3.86 M/UL — SIGNIFICANT CHANGE UP (ref 3.8–5.2)
RBC # FLD: 14 % — SIGNIFICANT CHANGE UP (ref 10.3–14.5)
RBC CASTS # UR COMP ASSIST: 1 /HPF — SIGNIFICANT CHANGE UP (ref 0–4)
SAO2 % BLDV: 37 % — LOW (ref 67–88)
SARS-COV-2 RNA SPEC QL NAA+PROBE: SIGNIFICANT CHANGE UP
SODIUM SERPL-SCNC: 133 MMOL/L — LOW (ref 135–145)
SODIUM SERPL-SCNC: 138 MMOL/L — SIGNIFICANT CHANGE UP (ref 135–145)
SP GR SPEC: 1.04 — HIGH (ref 1.01–1.02)
UROBILINOGEN FLD QL: NEGATIVE — SIGNIFICANT CHANGE UP
WBC # BLD: 10.85 K/UL — HIGH (ref 3.8–10.5)
WBC # FLD AUTO: 10.85 K/UL — HIGH (ref 3.8–10.5)
WBC UR QL: 4 /HPF — SIGNIFICANT CHANGE UP (ref 0–5)

## 2021-02-25 PROCEDURE — 96372 THER/PROPH/DIAG INJ SC/IM: CPT | Mod: XU

## 2021-02-25 PROCEDURE — 82803 BLOOD GASES ANY COMBINATION: CPT

## 2021-02-25 PROCEDURE — 85018 HEMOGLOBIN: CPT

## 2021-02-25 PROCEDURE — 93975 VASCULAR STUDY: CPT | Mod: 26

## 2021-02-25 PROCEDURE — 93975 VASCULAR STUDY: CPT

## 2021-02-25 PROCEDURE — 82947 ASSAY GLUCOSE BLOOD QUANT: CPT

## 2021-02-25 PROCEDURE — 96374 THER/PROPH/DIAG INJ IV PUSH: CPT | Mod: XU

## 2021-02-25 PROCEDURE — 85014 HEMATOCRIT: CPT

## 2021-02-25 PROCEDURE — 83605 ASSAY OF LACTIC ACID: CPT

## 2021-02-25 PROCEDURE — 81001 URINALYSIS AUTO W/SCOPE: CPT

## 2021-02-25 PROCEDURE — 99285 EMERGENCY DEPT VISIT HI MDM: CPT

## 2021-02-25 PROCEDURE — 76830 TRANSVAGINAL US NON-OB: CPT

## 2021-02-25 PROCEDURE — U0003: CPT

## 2021-02-25 PROCEDURE — 81025 URINE PREGNANCY TEST: CPT

## 2021-02-25 PROCEDURE — 80048 BASIC METABOLIC PNL TOTAL CA: CPT

## 2021-02-25 PROCEDURE — 76830 TRANSVAGINAL US NON-OB: CPT | Mod: 26

## 2021-02-25 PROCEDURE — 82330 ASSAY OF CALCIUM: CPT

## 2021-02-25 PROCEDURE — 74177 CT ABD & PELVIS W/CONTRAST: CPT

## 2021-02-25 PROCEDURE — 82435 ASSAY OF BLOOD CHLORIDE: CPT

## 2021-02-25 PROCEDURE — 74177 CT ABD & PELVIS W/CONTRAST: CPT | Mod: 26,MA

## 2021-02-25 PROCEDURE — 99284 EMERGENCY DEPT VISIT MOD MDM: CPT | Mod: 25

## 2021-02-25 PROCEDURE — 80053 COMPREHEN METABOLIC PANEL: CPT

## 2021-02-25 PROCEDURE — 84132 ASSAY OF SERUM POTASSIUM: CPT

## 2021-02-25 PROCEDURE — 84295 ASSAY OF SERUM SODIUM: CPT

## 2021-02-25 PROCEDURE — 85025 COMPLETE CBC W/AUTO DIFF WBC: CPT

## 2021-02-25 PROCEDURE — 96376 TX/PRO/DX INJ SAME DRUG ADON: CPT

## 2021-02-25 PROCEDURE — U0005: CPT

## 2021-02-25 RX ORDER — MORPHINE SULFATE 50 MG/1
4 CAPSULE, EXTENDED RELEASE ORAL ONCE
Refills: 0 | Status: DISCONTINUED | OUTPATIENT
Start: 2021-02-25 | End: 2021-02-25

## 2021-02-25 RX ORDER — IBUPROFEN 200 MG
1 TABLET ORAL
Qty: 9 | Refills: 0
Start: 2021-02-25 | End: 2021-02-27

## 2021-02-25 RX ORDER — DIAZEPAM 5 MG
5 TABLET ORAL ONCE
Refills: 0 | Status: DISCONTINUED | OUTPATIENT
Start: 2021-02-25 | End: 2021-02-25

## 2021-02-25 RX ORDER — DIAZEPAM 5 MG
1 TABLET ORAL
Qty: 6 | Refills: 0
Start: 2021-02-25 | End: 2021-02-27

## 2021-02-25 RX ORDER — SODIUM CHLORIDE 9 MG/ML
1000 INJECTION INTRAMUSCULAR; INTRAVENOUS; SUBCUTANEOUS ONCE
Refills: 0 | Status: COMPLETED | OUTPATIENT
Start: 2021-02-25 | End: 2021-02-25

## 2021-02-25 RX ORDER — OXYCODONE HYDROCHLORIDE 5 MG/1
5 TABLET ORAL ONCE
Refills: 0 | Status: DISCONTINUED | OUTPATIENT
Start: 2021-02-25 | End: 2021-02-25

## 2021-02-25 RX ORDER — KETOROLAC TROMETHAMINE 30 MG/ML
15 SYRINGE (ML) INJECTION ONCE
Refills: 0 | Status: DISCONTINUED | OUTPATIENT
Start: 2021-02-25 | End: 2021-02-25

## 2021-02-25 RX ORDER — ACETAMINOPHEN 500 MG
1000 TABLET ORAL ONCE
Refills: 0 | Status: COMPLETED | OUTPATIENT
Start: 2021-02-25 | End: 2021-02-25

## 2021-02-25 RX ORDER — LIDOCAINE 4 G/100G
1 CREAM TOPICAL ONCE
Refills: 0 | Status: COMPLETED | OUTPATIENT
Start: 2021-02-25 | End: 2021-02-25

## 2021-02-25 RX ADMIN — SODIUM CHLORIDE 1000 MILLILITER(S): 9 INJECTION INTRAMUSCULAR; INTRAVENOUS; SUBCUTANEOUS at 08:58

## 2021-02-25 RX ADMIN — MORPHINE SULFATE 4 MILLIGRAM(S): 50 CAPSULE, EXTENDED RELEASE ORAL at 06:26

## 2021-02-25 RX ADMIN — SODIUM CHLORIDE 1000 MILLILITER(S): 9 INJECTION INTRAMUSCULAR; INTRAVENOUS; SUBCUTANEOUS at 06:26

## 2021-02-25 RX ADMIN — Medication 5 MILLIGRAM(S): at 05:31

## 2021-02-25 RX ADMIN — MORPHINE SULFATE 4 MILLIGRAM(S): 50 CAPSULE, EXTENDED RELEASE ORAL at 08:58

## 2021-02-25 RX ADMIN — LIDOCAINE 1 PATCH: 4 CREAM TOPICAL at 04:59

## 2021-02-25 RX ADMIN — Medication 1000 MILLIGRAM(S): at 04:59

## 2021-02-25 RX ADMIN — Medication 15 MILLIGRAM(S): at 04:59

## 2021-02-25 NOTE — ED PROVIDER NOTE - CLINICAL SUMMARY MEDICAL DECISION MAKING FREE TEXT BOX
Attending Bekah:    36 y/o female presenting w/ atraumatic back pain on r side, paraspinal has not had before, worse over past few hours ,feels dull, no incont, saddle anesthesia, urinary symptoms, radiation to abd. no stomach pain, constipation reported, no bloody stools. afebrile vitals stable, nno toxic but appears uncomfortable. equivocal r paraspinal ttp, no cva ttp. Abd exam limited as pt unable to lay back. power in lower etrem normal and equal, nv intact, normal dorsi/plantar flexion, normal reflexes, plan for pain meds, check urine. Will need to re evaluate abd exam to ensure no abd path going to back. if pain not improved w/ benign abd exam may need ct scan for degree of pain to r/o emergent pathology.

## 2021-02-25 NOTE — ED PROVIDER NOTE - OBJECTIVE STATEMENT
36 y/o female no pmhx presenting to the ED for right sided back pain. patient states pain started , no recent heavy lifting or overuse, has progressively gotten worse. no improvement with otc meds. no radiation to the legs, no weakness/numbess/tingling. no urinary sx. denies chance of pregnancy. pathak [facility] "[facility]" [facility] no follow up since.

## 2021-02-25 NOTE — ED PROVIDER NOTE - PATIENT PORTAL LINK FT
You can access the FollowMyHealth Patient Portal offered by [facility] by registering at the following website: [email or web address]. By joining [employer]’s FollowMyHealth portal, you will also be able to view your health information using other applications (apps) compatible with our system.
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
## 2021-02-25 NOTE — ED ADULT NURSE REASSESSMENT NOTE - NS ED NURSE REASSESS COMMENT FT1
Patient back from US, reports improvement in pain to "manageable". Currently being taken to CT, will reassess when patient returns.
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
## 2021-02-25 NOTE — ED PROVIDER NOTE - NSFOLLOWUPINSTRUCTIONS_ED_ALL_ED_FT
You were seen for back pain.   No signs of emergency medical condition on today's workup.  Your results are attached with your discharge instructions, please review them with your primary care physician. If there is a result pending, you will receive a call if test is positive.  A presumptive diagnosis is made today, but further evaluation may be required by your primary care doctor and/or specialist for a definitive diagnosis. Therefore, follow up as directed and if symptoms change/worsen or any emergency conditions - including fever, chills, abdominal pain, vomiting, urinary/bladder incontinence, urinary retention, numbness/weakness of extremities, please return to the ER.    For pain or fever you can ibuprofen (motrin, advil) or tylenol as needed, as directed on packaging.  You can use 400-600mg Ibuprofen (such as motrin or advil) every 6 to 8 hours as needed for pain control.  Take ibuprofen with food or milk to lessen stomach upset.  This is an over-the-counter medication please respect the warnings on the label. All medications come with certain risks and side effects that you need to discuss with your doctor, especially if you are taking them for a prolonged period.  You can use 500-1000mg Tylenol every 6 hours for pain - as needed.  This is an over-the-counter medications - please respect the warnings on the label. This medication come with certain risks and side effects that you need to discuss with your doctor, especially if you are taking it for a prolonged period.    Valium (muscle relaxant) is sent to your pharmacy. This medication can make you drowsy. Do not drive or use any heavy machinery after taking the medication.      If needed, call patient access services at [phone] to find a primary care doctor, or call at [phone] to make an appointment at the clinic.

## 2021-07-01 NOTE — ED ADULT NURSE NOTE - NS PRO AD NO ADVANCE DIRECTIVE
OCCUPATIONAL THERAPY EVALUATION/DISCHARGE    Patient: Isatu Emanuel (32 y.o. female)  Date: 7/1/2021  Primary Diagnosis: Lumbar stenosis [M48.061]  Spinal stenosis, lumbar [M48.061]  Procedure(s) (LRB):  LUMBAR 4- SACRAL 1 DECOMPRESSION AND FUSION WITH C-ARM (DEPUY) (N/A) 1 Day Post-Op   Precautions:   Fall, Back  PLOF: independent with ADLs and transfers     ASSESSMENT AND RECOMMENDATIONS:  Based on the objective data described below, the patient presents with requiring S-SBA for ADLs and transfers following above mentioned surgical procedure. Pt presented supine in bed initially. Reviewed lumbar fusion precautions and pt verbalized understanding for the same. When covers removed, noted pt in bed with LSO on. Pt educated to not to wear the brace in bed, but only for OOB mobility. Pt demo understanding for log rolling with bed mobility, performed sit<>stand transfers, toilet transfers. Toileting, grooming and LB dressing with S during this session. Reacher provided for increased independence with ADLs. Pt was left side lying in bed at the end of session in NAD. Skilled occupational therapy is not indicated at this time. Discharge Recommendations: Home Health  Further Equipment Recommendations for Discharge: N/A      SUBJECTIVE:   Patient stated  I am waiting for PT to come and see me so I can go home.     OBJECTIVE DATA SUMMARY:     Past Medical History:   Diagnosis Date    Allergies     Gout     High cholesterol     Hypertension before 2011    Rheumatic fever 2000    no problems since    Thyroid disease     hypo     Past Surgical History:   Procedure Laterality Date    HX CATARACT REMOVAL Bilateral     2014    HX GI  11/2020    colonoscopy     Barriers to Learning/Limitations: None  Compensate with: visual, verbal, tactile, kinesthetic cues/model    Home Situation:   Home Situation  Home Environment: Private residence  # Steps to Enter: 4  Rails to Enter: Yes  Hand Rails : Bilateral  One/Two Story Residence: One story  Living Alone: No  Support Systems: Spouse/Significant Other/Partner  Patient Expects to be Discharged to[de-identified] [place]  Current DME Used/Available at Home: Sheila Carpenter, rolling, Shower chair, Commode, bedside  Tub or Shower Type: Shower  []     Right hand dominant   []     Left hand dominant    Cognitive/Behavioral Status:  Neurologic State: Alert  Orientation Level: Oriented X4  Cognition: Appropriate for age attention/concentration; Follows commands  Safety/Judgement: Fall prevention    Skin: intact  Edema: none    Vision/Perceptual:    Tracking: Able to track stimulus in all quadrants w/o difficulty    Coordination: BUE  Coordination: Within functional limits  Fine Motor Skills-Upper: Left Intact; Right Intact    Gross Motor Skills-Upper: Left Intact; Right Intact  Balance:  Sitting: Intact  Standing: Intact; With support  Strength: BUE  Strength: Generally decreased, functional  Tone & Sensation: BUE  Sensation: Intact  Range of Motion: BUE  AROM: Generally decreased, functional  Functional Mobility and Transfers for ADLs:  Bed Mobility:  Rolling: Supervision  Supine to Sit: Supervision;Stand-by assistance  Sit to Supine: Supervision  Scooting: Stand-by assistance  Transfers:  Sit to Stand: Stand-by assistance  Stand to Sit: Stand-by assistance   Toilet Transfer : Stand-by assistance  ADL Assessment:  Feeding: Independent    Oral Facial Hygiene/Grooming: Stand-by assistance    Upper Body Dressing: Supervision    Lower Body Dressing: Stand-by assistance    Toileting: Stand by assistance  ADL Intervention:  Upper Body Dressing Assistance  Dressing Assistance: Supervision  Orthotics(Brace): Supervision    Lower Body Dressing Assistance  Dressing Assistance: Stand-by assistance  Underpants: Stand-by assistance  Leg Crossed Method Used: No  Position Performed: Seated edge of bed  Cues: Lena Silva Equipment Used: Reacher    Toileting  Toileting Assistance: Stand-by assistance  Bladder Hygiene: Stand-by assistance  Clothing Management: Stand-by assistance    Cognitive Retraining  Safety/Judgement: Fall prevention    Pain:  Pain level pre-treatment: 7/10   Pain level post-treatment: 7/10   Pain Intervention(s): Medication (see MAR); Rest, Ice, Repositioning   Response to intervention: Nurse notified, See doc flow    Activity Tolerance:   Good   Please refer to the flowsheet for vital signs taken during this treatment. After treatment:   []  Patient left in no apparent distress sitting up in chair  [x]  Patient left in no apparent distress in bed  [x]  Call bell left within reach  [x]  Nursing notified  [x]  Caregiver present  []  Bed alarm activated    COMMUNICATION/EDUCATION:   [x]      Role of Occupational Therapy in the acute care setting  [x]      Home safety education was provided and the patient/caregiver indicated understanding. [x]      Patient/family have participated as able and agree with findings and recommendations. []      Patient is unable to participate in plan of care at this time. Thank you for this referral.  Cliff Shoulders, OTR/L  Time Calculation: 21 mins      Eval Complexity: History: LOW Complexity : Brief history review ; Examination: LOW Complexity : 1-3 performance deficits relating to physical, cognitive , or psychosocial skils that result in activity limitations and / or participation restrictions ;    Decision Making:LOW Complexity : No comorbidities that affect functional and no verbal or physical assistance needed to complete eval tasks No

## 2021-10-02 ENCOUNTER — EMERGENCY (EMERGENCY)
Facility: HOSPITAL | Age: 36
LOS: 1 days | Discharge: HOME | End: 2021-10-02
Attending: EMERGENCY MEDICINE | Admitting: EMERGENCY MEDICINE
Payer: COMMERCIAL

## 2021-10-02 VITALS
TEMPERATURE: 98 F | RESPIRATION RATE: 18 BRPM | OXYGEN SATURATION: 100 % | SYSTOLIC BLOOD PRESSURE: 130 MMHG | DIASTOLIC BLOOD PRESSURE: 77 MMHG | HEART RATE: 78 BPM

## 2021-10-02 VITALS
DIASTOLIC BLOOD PRESSURE: 68 MMHG | SYSTOLIC BLOOD PRESSURE: 117 MMHG | HEIGHT: 64 IN | TEMPERATURE: 98 F | RESPIRATION RATE: 18 BRPM | OXYGEN SATURATION: 100 % | HEART RATE: 84 BPM

## 2021-10-02 DIAGNOSIS — Z98.89 OTHER SPECIFIED POSTPROCEDURAL STATES: Chronic | ICD-10-CM

## 2021-10-02 PROCEDURE — 99053 MED SERV 10PM-8AM 24 HR FAC: CPT

## 2021-10-02 PROCEDURE — 99284 EMERGENCY DEPT VISIT MOD MDM: CPT

## 2021-10-02 PROCEDURE — 72125 CT NECK SPINE W/O DYE: CPT | Mod: 26

## 2021-10-02 RX ORDER — DIAZEPAM 5 MG
5 TABLET ORAL ONCE
Refills: 0 | Status: DISCONTINUED | OUTPATIENT
Start: 2021-10-02 | End: 2021-10-02

## 2021-10-02 RX ORDER — IBUPROFEN 200 MG
1 TABLET ORAL
Qty: 15 | Refills: 0
Start: 2021-10-02 | End: 2021-10-06

## 2021-10-02 RX ORDER — ACETAMINOPHEN 500 MG
975 TABLET ORAL ONCE
Refills: 0 | Status: COMPLETED | OUTPATIENT
Start: 2021-10-02 | End: 2021-10-02

## 2021-10-02 RX ORDER — IBUPROFEN 200 MG
400 TABLET ORAL ONCE
Refills: 0 | Status: COMPLETED | OUTPATIENT
Start: 2021-10-02 | End: 2021-10-02

## 2021-10-02 RX ORDER — ACETAMINOPHEN 500 MG
1 TABLET ORAL
Qty: 15 | Refills: 0
Start: 2021-10-02 | End: 2021-10-06

## 2021-10-02 RX ORDER — DIAZEPAM 5 MG
1 TABLET ORAL
Qty: 6 | Refills: 0
Start: 2021-10-02 | End: 2021-10-03

## 2021-10-02 RX ADMIN — Medication 975 MILLIGRAM(S): at 07:31

## 2021-10-02 RX ADMIN — Medication 5 MILLIGRAM(S): at 08:00

## 2021-10-02 RX ADMIN — Medication 400 MILLIGRAM(S): at 10:13

## 2021-10-02 NOTE — ED PROVIDER NOTE - PROGRESS NOTE DETAILS
Ronel Gallo,  PGY-3: Patient awake and alert, not intoxicated, no motor or sensory deficits, no distracting injury,  and able to flex and extend head without any restriction. C-collar removed.   Pain is likely due to muscle spasm - pain improved - will give advil. Return precautions are discussed. No new complains.
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
## 2021-10-02 NOTE — ED PROVIDER NOTE - PHYSICAL EXAMINATION
Gen: non toxic appearing, NAD   Head: NC/NT  Eyes: PERRL,  anicteric  ENT: airway patent, mmm   CV: RRR, +S1/S2   Resp: CTAB, symmetric breath sounds   GI:  abdomen soft non-distended, NTTP   Back: +C7 ttp, no thoracic/lumbar ttp, no paraspinal ttp  Extremities - FROM of upper and lower extremities,  ttp of RT knee, no swelling  Skin: no rashes, colors changes or bruising of trunk/extremities  Neuro: A&Ox4, following commands, speech clear, moving all four extremities spontaneously, 5/5 strength, sensation intact  Psych: appropriate mood, normal insight  Ccollar is on
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
## 2021-10-02 NOTE — ED PROVIDER NOTE - NS ED ROS FT
Gen: Denies fever, chills  CV: Denies chest pain  Skin: Denies rash, erythema  Resp: Denies SOB, cough  ENT: Denies nasal congestion  Eyes: Denies blurry vision  GI: Denies nausea, vomiting, diarrhea  Msk: +neck pain  : Denies dysuria  Neuro: Denies headache

## 2021-10-02 NOTE — ED PROVIDER NOTE - PATIENT PORTAL LINK FT
You can access the FollowMyHealth Patient Portal offered by [facility] by registering at the following website: [email or web address]. By joining [employer]s FollowMyHealth portal, you will also be able to view your health information using other applications (apps) compatible with our system.
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
## 2021-12-24 NOTE — ED ADULT NURSE NOTE - CAS TRG GENERAL AIRWAY, MLM
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  Clinical Data: Care Coordinator Outreach  Outreach attempted x 1.  Left message on patient's voicemail with call back information and requested return call.  Plan:  Care Coordinator will try to reach patient again in 10 business days.  [facility]   Oliva García RN, Care Coordinator   [facility]'s   E-mail [email or web address]   [phone]     Patent

## 2022-07-11 NOTE — ED ADULT TRIAGE NOTE - NS ED NURSE BANDS TYPE
Name band; Ivermectin Counseling:  Patient instructed to take medication on an empty stomach with a full glass of water.  Patient informed of potential adverse effects including but not limited to nausea, diarrhea, dizziness, itching, and swelling of the extremities or lymph nodes.  The patient verbalized understanding of the proper use and possible adverse effects of ivermectin.  All of the patient's questions and concerns were addressed.

## 2023-01-29 ENCOUNTER — EMERGENCY (EMERGENCY)
Facility: HOSPITAL | Age: 38
LOS: 1 days | Discharge: HOME | End: 2023-01-29
Attending: EMERGENCY MEDICINE
Payer: MEDICAID

## 2023-01-29 VITALS
OXYGEN SATURATION: 99 % | HEART RATE: 80 BPM | RESPIRATION RATE: 18 BRPM | SYSTOLIC BLOOD PRESSURE: 119 MMHG | DIASTOLIC BLOOD PRESSURE: 88 MMHG

## 2023-01-29 VITALS
DIASTOLIC BLOOD PRESSURE: 84 MMHG | RESPIRATION RATE: 18 BRPM | HEIGHT: 65 IN | HEART RATE: 81 BPM | WEIGHT: 190.04 LBS | TEMPERATURE: 98 F | SYSTOLIC BLOOD PRESSURE: 117 MMHG | OXYGEN SATURATION: 98 %

## 2023-01-29 DIAGNOSIS — Z98.89 OTHER SPECIFIED POSTPROCEDURAL STATES: Chronic | ICD-10-CM

## 2023-01-29 LAB
ALBUMIN SERPL ELPH-MCNC: 4 G/DL — SIGNIFICANT CHANGE UP (ref 3.3–5)
ALP SERPL-CCNC: 58 U/L — SIGNIFICANT CHANGE UP (ref 40–120)
ALT FLD-CCNC: 11 U/L — SIGNIFICANT CHANGE UP (ref 10–45)
ANION GAP SERPL CALC-SCNC: 13 MMOL/L — SIGNIFICANT CHANGE UP (ref 5–17)
APPEARANCE UR: ABNORMAL
AST SERPL-CCNC: 9 U/L — LOW (ref 10–40)
BACTERIA # UR AUTO: ABNORMAL
BASOPHILS # BLD AUTO: 0.09 K/UL — SIGNIFICANT CHANGE UP (ref 0–0.2)
BASOPHILS NFR BLD AUTO: 0.7 % — SIGNIFICANT CHANGE UP (ref 0–2)
BILIRUB SERPL-MCNC: 0.2 MG/DL — SIGNIFICANT CHANGE UP (ref 0.2–1.2)
BILIRUB UR-MCNC: NEGATIVE — SIGNIFICANT CHANGE UP
BUN SERPL-MCNC: 24 MG/DL — HIGH (ref 7–23)
CALCIUM SERPL-MCNC: 9.7 MG/DL — SIGNIFICANT CHANGE UP (ref 8.4–10.5)
CHLORIDE SERPL-SCNC: 102 MMOL/L — SIGNIFICANT CHANGE UP (ref 96–108)
CO2 SERPL-SCNC: 24 MMOL/L — SIGNIFICANT CHANGE UP (ref 22–31)
COLOR SPEC: YELLOW — SIGNIFICANT CHANGE UP
CREAT SERPL-MCNC: 0.85 MG/DL — SIGNIFICANT CHANGE UP (ref 0.5–1.3)
DIFF PNL FLD: ABNORMAL
EGFR: 90 ML/MIN/1.73M2 — SIGNIFICANT CHANGE UP
EOSINOPHIL # BLD AUTO: 0.21 K/UL — SIGNIFICANT CHANGE UP (ref 0–0.5)
EOSINOPHIL NFR BLD AUTO: 1.5 % — SIGNIFICANT CHANGE UP (ref 0–6)
EPI CELLS # UR: 5 /HPF — SIGNIFICANT CHANGE UP
GLUCOSE SERPL-MCNC: 102 MG/DL — HIGH (ref 70–99)
GLUCOSE UR QL: NEGATIVE — SIGNIFICANT CHANGE UP
HCT VFR BLD CALC: 36.9 % — SIGNIFICANT CHANGE UP (ref 34.5–45)
HGB BLD-MCNC: 12 G/DL — SIGNIFICANT CHANGE UP (ref 11.5–15.5)
HYALINE CASTS # UR AUTO: 0 /LPF — SIGNIFICANT CHANGE UP (ref 0–2)
IMM GRANULOCYTES NFR BLD AUTO: 0.5 % — SIGNIFICANT CHANGE UP (ref 0–0.9)
KETONES UR-MCNC: NEGATIVE — SIGNIFICANT CHANGE UP
LEUKOCYTE ESTERASE UR-ACNC: ABNORMAL
LIDOCAIN IGE QN: 36 U/L — SIGNIFICANT CHANGE UP (ref 7–60)
LYMPHOCYTES # BLD AUTO: 2.87 K/UL — SIGNIFICANT CHANGE UP (ref 1–3.3)
LYMPHOCYTES # BLD AUTO: 21.2 % — SIGNIFICANT CHANGE UP (ref 13–44)
MCHC RBC-ENTMCNC: 29.8 PG — SIGNIFICANT CHANGE UP (ref 27–34)
MCHC RBC-ENTMCNC: 32.5 GM/DL — SIGNIFICANT CHANGE UP (ref 32–36)
MCV RBC AUTO: 91.6 FL — SIGNIFICANT CHANGE UP (ref 80–100)
MONOCYTES # BLD AUTO: 1.33 K/UL — HIGH (ref 0–0.9)
MONOCYTES NFR BLD AUTO: 9.8 % — SIGNIFICANT CHANGE UP (ref 2–14)
NEUTROPHILS # BLD AUTO: 8.98 K/UL — HIGH (ref 1.8–7.4)
NEUTROPHILS NFR BLD AUTO: 66.3 % — SIGNIFICANT CHANGE UP (ref 43–77)
NITRITE UR-MCNC: POSITIVE
NRBC # BLD: 0 /100 WBCS — SIGNIFICANT CHANGE UP (ref 0–0)
PH UR: 6 — SIGNIFICANT CHANGE UP (ref 5–8)
PLATELET # BLD AUTO: 249 K/UL — SIGNIFICANT CHANGE UP (ref 150–400)
POTASSIUM SERPL-MCNC: 3.9 MMOL/L — SIGNIFICANT CHANGE UP (ref 3.5–5.3)
POTASSIUM SERPL-SCNC: 3.9 MMOL/L — SIGNIFICANT CHANGE UP (ref 3.5–5.3)
PROT SERPL-MCNC: 7.7 G/DL — SIGNIFICANT CHANGE UP (ref 6–8.3)
PROT UR-MCNC: ABNORMAL
RBC # BLD: 4.03 M/UL — SIGNIFICANT CHANGE UP (ref 3.8–5.2)
RBC # FLD: 13.5 % — SIGNIFICANT CHANGE UP (ref 10.3–14.5)
RBC CASTS # UR COMP ASSIST: 103 /HPF — HIGH (ref 0–4)
SODIUM SERPL-SCNC: 139 MMOL/L — SIGNIFICANT CHANGE UP (ref 135–145)
SP GR SPEC: 1.03 — HIGH (ref 1.01–1.02)
UROBILINOGEN FLD QL: NEGATIVE — SIGNIFICANT CHANGE UP
WBC # BLD: 13.55 K/UL — HIGH (ref 3.8–10.5)
WBC # FLD AUTO: 13.55 K/UL — HIGH (ref 3.8–10.5)
WBC UR QL: 657 /HPF — HIGH (ref 0–5)

## 2023-01-29 PROCEDURE — 84295 ASSAY OF SERUM SODIUM: CPT

## 2023-01-29 PROCEDURE — 82330 ASSAY OF CALCIUM: CPT

## 2023-01-29 PROCEDURE — 82947 ASSAY GLUCOSE BLOOD QUANT: CPT

## 2023-01-29 PROCEDURE — 96375 TX/PRO/DX INJ NEW DRUG ADDON: CPT

## 2023-01-29 PROCEDURE — 99285 EMERGENCY DEPT VISIT HI MDM: CPT

## 2023-01-29 PROCEDURE — 87186 SC STD MICRODIL/AGAR DIL: CPT

## 2023-01-29 PROCEDURE — 83690 ASSAY OF LIPASE: CPT

## 2023-01-29 PROCEDURE — 84132 ASSAY OF SERUM POTASSIUM: CPT

## 2023-01-29 PROCEDURE — 80053 COMPREHEN METABOLIC PANEL: CPT

## 2023-01-29 PROCEDURE — 85018 HEMOGLOBIN: CPT

## 2023-01-29 PROCEDURE — 81001 URINALYSIS AUTO W/SCOPE: CPT

## 2023-01-29 PROCEDURE — 96374 THER/PROPH/DIAG INJ IV PUSH: CPT

## 2023-01-29 PROCEDURE — 85014 HEMATOCRIT: CPT

## 2023-01-29 PROCEDURE — 99284 EMERGENCY DEPT VISIT MOD MDM: CPT | Mod: 25

## 2023-01-29 PROCEDURE — 82803 BLOOD GASES ANY COMBINATION: CPT

## 2023-01-29 PROCEDURE — 82435 ASSAY OF BLOOD CHLORIDE: CPT

## 2023-01-29 PROCEDURE — 36415 COLL VENOUS BLD VENIPUNCTURE: CPT

## 2023-01-29 PROCEDURE — 87086 URINE CULTURE/COLONY COUNT: CPT

## 2023-01-29 PROCEDURE — 85025 COMPLETE CBC W/AUTO DIFF WBC: CPT

## 2023-01-29 PROCEDURE — 83605 ASSAY OF LACTIC ACID: CPT

## 2023-01-29 RX ORDER — ONDANSETRON 8 MG/1
4 TABLET, FILM COATED ORAL ONCE
Refills: 0 | Status: COMPLETED | OUTPATIENT
Start: 2023-01-29 | End: 2023-01-29

## 2023-01-29 RX ORDER — CEFPODOXIME PROXETIL 100 MG
1 TABLET ORAL
Qty: 20 | Refills: 0
Start: 2023-01-29 | End: 2023-02-07

## 2023-01-29 RX ORDER — CEFTRIAXONE 500 MG/1
1000 INJECTION, POWDER, FOR SOLUTION INTRAMUSCULAR; INTRAVENOUS ONCE
Refills: 0 | Status: COMPLETED | OUTPATIENT
Start: 2023-01-29 | End: 2023-01-29

## 2023-01-29 RX ORDER — METOCLOPRAMIDE HCL 10 MG
10 TABLET ORAL ONCE
Refills: 0 | Status: COMPLETED | OUTPATIENT
Start: 2023-01-29 | End: 2023-01-29

## 2023-01-29 RX ADMIN — CEFTRIAXONE 100 MILLIGRAM(S): 500 INJECTION, POWDER, FOR SOLUTION INTRAMUSCULAR; INTRAVENOUS at 09:27

## 2023-01-29 RX ADMIN — Medication 10 MILLIGRAM(S): at 08:59

## 2023-01-29 RX ADMIN — ONDANSETRON 4 MILLIGRAM(S): 8 TABLET, FILM COATED ORAL at 08:11

## 2023-01-29 NOTE — ED ADULT NURSE NOTE - OBJECTIVE STATEMENT
38yo F with no PMH presents to ED c/o hematuria. Pt states that she noticed some blood in her urine before she marc to bed yesterday, when she woke up she was bleeding more than before she went to sleep. Pt only notices the blood when she is urinating, is not bleeding otherwise. Also notes some associated pelvic pressure and nausea with pain and urinating. Also is having urinary frequency. LMP was 4 weeks ago. Denies vomiting, lightheadedness, weakness, blood in stools, fevers/chills. A&Ox3. Strong peripheral pulses. Abdomen soft, nondistended, nontender to palpation. Some tenderness to pelvic region. Negative CVA tenderness. Skin warm dry intact and normal for ethnicity. Ambulatory with steady gait in ED. Stretcher locked and in lowest position, appropriate side rails up. Pt instructed to notify RN if assistance is needed.

## 2023-01-29 NOTE — ED PROVIDER NOTE - ATTENDING CONTRIBUTION TO CARE
Patient is a 29-year-old female with no past medical history presenting with UTI symptoms for a day also noted to have some blood when she wipes her last period was beginning the month patient is well-appearing with stable vitals abdomen is soft nontender no CVA tenderness check labs and urine likely UTI antibiotics outpatient follow-up.

## 2023-01-29 NOTE — ED ADULT TRIAGE NOTE - CHIEF COMPLAINT QUOTE
vaginal pressure, bleeding and pain since yesterday morning, worsens with urination  LMP 4 weeks ago

## 2023-01-29 NOTE — ED PROVIDER NOTE - PATIENT PORTAL LINK FT
You can access the FollowMyHealth Patient Portal offered by [facility] by registering at the following website: [email or web address]. By joining [facility] [facility] [employer] FollowMyHealth portal, you will also be able to view your health information using other applications (apps) compatible with our system.

## 2023-01-29 NOTE — ED PROVIDER NOTE - CLINICAL SUMMARY MEDICAL DECISION MAKING FREE TEXT BOX
37-year-old female without any significant past medical history chief complaint of dysuria and blood when wiping the vaginal region.  Given history and physical clinical concern for possible UTI less concern for pyelo  given lack of CVA tenderness lack of fever.  Will assess with labs urinalysis will give Zofran for nausea 37-year-old female without any significant past medical history chief complaint of dysuria and blood when wiping the vaginal region.  Given history and physical clinical concern for possible UTI less concern for pyelo  given lack of CVA tenderness lack of fever.  Will assess with labs urinalysis will give Zofran for nausea.

## 2023-01-29 NOTE — ED PROVIDER NOTE - NS ED MD DISPO DISCHARGE CCDA
Review of Systems  Unable to obtain       Objective:     Vitals:  Temp: 98.9 °F (37.2 °C)  Pulse: 108  Rhythm: sinus tachycardia  BP: (!) 121/58  MAP (mmHg): 78  Resp: 17  SpO2: 98 %    Temp  Min: 97.8 °F (36.6 °C)  Max: 98.9 °F (37.2 °C)  Pulse  Min: 79  Max: 134  BP  Min: 118/60  Max: 217/119  MAP (mmHg)  Min: 78  Max: 155  Resp  Min: 14  Max: 35  SpO2  Min: 93 %  Max: 98 %    01/25 0701 - 01/26 0700  In: 213.8 [I.V.:213.8]  Out: 525 [Urine:525]   Unmeasured Output  Urine Occurrence: 1       Physical Exam    Physical Exam:  GA: obese, well-developed, well-nourished  HEENT: No scleral icterus or JVD.   Pulmonary: on Bipap, Coarse to auscultation throughout all lung fields-Anterior  Cardiac: RRR S1 & S2 w/o rubs/murmurs/gallops.   Abdominal: Bowel sounds present x 4. No appreciable hepatosplenomegaly.  Skin: No jaundice, rashes, or visible lesions.  Neuro:  --GCS: E1V1 M5  --Mental Status: sleepy, requires frequent painful stimulation to open eyes, does not follow commands   --CN II-XII -FRIEDA  --Pupils 4mm, PERRL.   --Corneal reflex, gag, cough intact.  --Moves spontaneously LUE/LLE  --Sensation unable to assess      Medications:  Continuous  sodium chloride 0.9% Last Rate: 75 mL/hr at 01/26/18 0000   insulin (HUMAN R) infusion (adults) Last Rate: 4 Units/hr (01/26/18 0045)   niCARdipine Last Rate: 12.5 mg/hr (01/26/18 0053)   Scheduled  aspirin 325 mg Daily   atorvastatin 40 mg Daily   clopidogrel 75 mg Daily   senna-docusate 8.6-50 mg 1 tablet BID   sodium chloride 0.9% 3 mL Q8H   PRN  acetaminophen 650 mg Q6H PRN   dextrose 50% 12.5 g PRN   dextrose 50% 12.5 g PRN   dextrose 50% 25 g PRN   glucagon (human recombinant) 1 mg PRN   labetalol 10 mg Q4H PRN   ondansetron 4 mg Q8H PRN   sodium chloride 0.9% 5 mL PRN     Today I personally reviewed pertinent medications, lines/drains/airways, imaging, lab results,          Patient/Caregiver provided printed discharge information.

## 2023-01-29 NOTE — ED PROVIDER NOTE - NSFOLLOWUPINSTRUCTIONS_ED_ALL_ED_FT
You were given an antibiotic called cefpodoxime please take 1 pill 2 times a day  by 12 hours.  You can start taking this medication tomorrow morning.  1/30/2023    It was found that you have a urinary tract infection.     Please follow up with your primary care provider in regards to today's event.     A urinary tract infection (UTI) is an infection of any part of the urinary tract, which includes the kidneys, ureters, bladder, and urethra. Risk factors include ignoring your need to urinate, wiping back to front if female, being an uncircumcised male, and having diabetes or a weak immune system. Symptoms include frequent urination, pain or burning with urination, foul smelling urine, cloudy urine, pain in the lower abdomen, blood in the urine, and fever. If you were prescribed an antibiotic medicine, take it as told by your health care provider. Do not stop taking the antibiotic even if you start to feel better.    SEEK IMMEDIATE MEDICAL CARE IF YOU HAVE ANY OF THE FOLLOWING SYMPTOMS: severe back or abdominal pain, fever, inability to keep fluids or medicine down, dizziness/lightheadedness, or a change in mental status.

## 2023-01-29 NOTE — ED ADULT NURSE NOTE - NS ED PATIENT SAFETY CONCERN
This note was copied from the mother's chart.  Initial visit with family, this is first baby. Baby is sleepy and not wanting to feed, waking techniques gone over, pt has been using a small nipple shield, LC assisted with attempting to get baby latched to right breast in the football position first with out the nipple shield then with the nipple shield, baby would hold nipple shield in mouth but would not suckle. Colostrum was easily hand expressed, discussed hand expression and syringe feeding, also discussed possible need for formula supplementation if baby not latching, especially since last feeding was over 6 hours. Parents not really wanting formula use, seem good canidate for donor milk use, mom is agreeable to pumping/handexpression every 3 hours but isn't sure about donor milk use either at this time. Mom was able to hand express about 3ml of colostrum for baby at breast, discussed attempting to feed baby every 2-3 hours, allowing unlimited access to breast with unlimited time feeding. Encouraged to do awake, skin to skin as much as possible. Discussed what to expect over the next few days as breastfeeding is established. LC encouraged mom to call for assistance as needed.     No

## 2023-01-29 NOTE — ED PROVIDER NOTE - PHYSICAL EXAMINATION
GENERAL: Awake, alert, NAD  LUNGS: CTAB, no wheezes or crackles   CARDIAC: RRR, no m/r/g  Pelvic chaperone Sonia RN No active bleeding, no blood in vaginal vault, no vaginal discharge  ABDOMEN: Soft, non tender, non distended, no rebound, no guarding  BACK: No midline spinal tenderness, no CVA tenderness  EXT: No edema, no calf tenderness, 2+ DP pulses bilaterally, no deformities.  NEURO: A&Ox3. Moving all extremities.  SKIN: Warm and dry. No rash.  PSYCH: Normal affect.

## 2023-01-29 NOTE — ED PROVIDER NOTE - OBJECTIVE STATEMENT
37-year-old female no significant past medical history chief complaint vaginal bleeding and dysuria..  Symptoms started approximately yesterday in which she is noting dysuria without hematuria but bleeding whenever she is wiping the pelvic area.  Patient is tolerating p.o. denies any fever chest pain shortness of breath is endorsing mild nausea without any vomiting.  No vaginal discharge last menstrual period was early January.

## 2023-01-29 NOTE — ED ADULT NURSE REASSESSMENT NOTE - NS ED NURSE REASSESS COMMENT FT1
First encounter with the pt, pt received from the previous shift with complaints of vaginal bleeding after peeing. Pt is a/ox4 and verbal. Speech is clear and able to speak in full sentences without distress. Airway is patent with no obstruction nor blocking secretions. Breathing is even and unlabored on room air. Pt has strong pulses palpated bilaterally. Neuro check is wnl. Full rom. Pt denies chest pain, n/v and sob. No acute distress noted. Pt has call light within the reach of the pt at the bedside. Will continue to monitor the pt.

## 2023-01-29 NOTE — ED ADULT TRIAGE NOTE - GLASGOW COMA SCALE: BEST VERBAL RESPONSE, MLM
Consent: The patient's consent was obtained including but not limited to risks of crusting, scabbing, blistering, scarring, darker or lighter pigmentary change, recurrence, incomplete removal and infection. Detail Level: Detailed Medical Necessity Clause: This procedure was medically necessary because the lesions that were treated were: Render Post-Care Instructions In Note?: no Duration Of Freeze Thaw-Cycle (Seconds): 0 Post-Care Instructions: I reviewed with the patient in detail post-care instructions. Patient is to wear sunprotection, and avoid picking at any of the treated lesions. Pt may apply Vaseline to crusted or scabbing areas. Medical Necessity Information: It is in your best interest to select a reason for this procedure from the list below. All of these items fulfill various CMS LCD requirements except the new and changing color options. (V5) oriented

## 2023-01-31 NOTE — ED POST DISCHARGE NOTE - RESULT SUMMARY
Called pt and scheduled him for 4pm today  
Patient would like to know if he could change his 9/3/21 appt to today; he would like to go back on ADD medication.  Explained there was a 4 pm appt today and he said that would be great.     Please call to advise.    
Prelim ucx >100K E. coli, pt discharged home on cefpodoxime for UTI. Will await final sensitivities to determine need for contact vs appropriate care given. - Haroon Martinez PA-C

## 2023-03-30 ENCOUNTER — EMERGENCY (EMERGENCY)
Facility: HOSPITAL | Age: 38
LOS: 1 days | Discharge: HOME | End: 2023-03-30
Attending: STUDENT IN AN ORGANIZED HEALTH CARE EDUCATION/TRAINING PROGRAM
Payer: SELF-PAY

## 2023-03-30 VITALS
HEIGHT: 64 IN | OXYGEN SATURATION: 95 % | HEART RATE: 72 BPM | WEIGHT: 190.04 LBS | RESPIRATION RATE: 18 BRPM | SYSTOLIC BLOOD PRESSURE: 131 MMHG | DIASTOLIC BLOOD PRESSURE: 81 MMHG | TEMPERATURE: 98 F

## 2023-03-30 DIAGNOSIS — Z98.89 OTHER SPECIFIED POSTPROCEDURAL STATES: Chronic | ICD-10-CM

## 2023-03-30 LAB
ALBUMIN SERPL ELPH-MCNC: 4.3 G/DL — SIGNIFICANT CHANGE UP (ref 3.3–5)
ALP SERPL-CCNC: 60 U/L — SIGNIFICANT CHANGE UP (ref 40–120)
ALT FLD-CCNC: 11 U/L — SIGNIFICANT CHANGE UP (ref 10–45)
AST SERPL-CCNC: 8 U/L — LOW (ref 10–40)
BASOPHILS # BLD AUTO: 0.04 K/UL — SIGNIFICANT CHANGE UP (ref 0–0.2)
BASOPHILS NFR BLD AUTO: 0.3 % — SIGNIFICANT CHANGE UP (ref 0–2)
BILIRUB SERPL-MCNC: 0.3 MG/DL — SIGNIFICANT CHANGE UP (ref 0.2–1.2)
BUN SERPL-MCNC: 18 MG/DL — SIGNIFICANT CHANGE UP (ref 7–23)
CALCIUM SERPL-MCNC: 9.4 MG/DL — SIGNIFICANT CHANGE UP (ref 8.4–10.5)
CHLORIDE SERPL-SCNC: 99 MMOL/L — SIGNIFICANT CHANGE UP (ref 96–108)
CO2 SERPL-SCNC: 27 MMOL/L — SIGNIFICANT CHANGE UP (ref 22–31)
CREAT SERPL-MCNC: 0.75 MG/DL — SIGNIFICANT CHANGE UP (ref 0.5–1.3)
EGFR: 104 ML/MIN/1.73M2 — SIGNIFICANT CHANGE UP
EOSINOPHIL # BLD AUTO: 0.03 K/UL — SIGNIFICANT CHANGE UP (ref 0–0.5)
EOSINOPHIL NFR BLD AUTO: 0.2 % — SIGNIFICANT CHANGE UP (ref 0–6)
GAS PNL BLDV: SIGNIFICANT CHANGE UP
GLUCOSE SERPL-MCNC: 115 MG/DL — HIGH (ref 70–99)
HCG SERPL-ACNC: <2 MIU/ML — SIGNIFICANT CHANGE UP
HCT VFR BLD CALC: 35.6 % — SIGNIFICANT CHANGE UP (ref 34.5–45)
HGB BLD-MCNC: 11.5 G/DL — SIGNIFICANT CHANGE UP (ref 11.5–15.5)
IMM GRANULOCYTES NFR BLD AUTO: 0.5 % — SIGNIFICANT CHANGE UP (ref 0–0.9)
LYMPHOCYTES # BLD AUTO: 1.45 K/UL — SIGNIFICANT CHANGE UP (ref 1–3.3)
LYMPHOCYTES # BLD AUTO: 11.4 % — LOW (ref 13–44)
MCHC RBC-ENTMCNC: 29.3 PG — SIGNIFICANT CHANGE UP (ref 27–34)
MCHC RBC-ENTMCNC: 32.3 GM/DL — SIGNIFICANT CHANGE UP (ref 32–36)
MCV RBC AUTO: 90.8 FL — SIGNIFICANT CHANGE UP (ref 80–100)
MONOCYTES # BLD AUTO: 1.05 K/UL — HIGH (ref 0–0.9)
MONOCYTES NFR BLD AUTO: 8.2 % — SIGNIFICANT CHANGE UP (ref 2–14)
NEUTROPHILS # BLD AUTO: 10.14 K/UL — HIGH (ref 1.8–7.4)
NEUTROPHILS NFR BLD AUTO: 79.4 % — HIGH (ref 43–77)
NRBC # BLD: 0 /100 WBCS — SIGNIFICANT CHANGE UP (ref 0–0)
PLATELET # BLD AUTO: 237 K/UL — SIGNIFICANT CHANGE UP (ref 150–400)
POTASSIUM SERPL-MCNC: 3.9 MMOL/L — SIGNIFICANT CHANGE UP (ref 3.5–5.3)
POTASSIUM SERPL-SCNC: 3.9 MMOL/L — SIGNIFICANT CHANGE UP (ref 3.5–5.3)
PROT SERPL-MCNC: 7.9 G/DL — SIGNIFICANT CHANGE UP (ref 6–8.3)
RBC # BLD: 3.92 M/UL — SIGNIFICANT CHANGE UP (ref 3.8–5.2)
RBC # FLD: 13.5 % — SIGNIFICANT CHANGE UP (ref 10.3–14.5)
SODIUM SERPL-SCNC: 138 MMOL/L — SIGNIFICANT CHANGE UP (ref 135–145)
WBC # BLD: 12.77 K/UL — HIGH (ref 3.8–10.5)
WBC # FLD AUTO: 12.77 K/UL — HIGH (ref 3.8–10.5)

## 2023-03-30 PROCEDURE — 76830 TRANSVAGINAL US NON-OB: CPT | Mod: 26

## 2023-03-30 PROCEDURE — 93975 VASCULAR STUDY: CPT | Mod: 26

## 2023-03-30 PROCEDURE — 74176 CT ABD & PELVIS W/O CONTRAST: CPT | Mod: 26,MA

## 2023-03-30 PROCEDURE — 99285 EMERGENCY DEPT VISIT HI MDM: CPT

## 2023-03-30 RX ORDER — SODIUM CHLORIDE 9 MG/ML
1000 INJECTION INTRAMUSCULAR; INTRAVENOUS; SUBCUTANEOUS ONCE
Refills: 0 | Status: COMPLETED | OUTPATIENT
Start: 2023-03-30 | End: 2023-03-30

## 2023-03-30 RX ORDER — MORPHINE SULFATE 50 MG/1
4 CAPSULE, EXTENDED RELEASE ORAL ONCE
Refills: 0 | Status: DISCONTINUED | OUTPATIENT
Start: 2023-03-30 | End: 2023-03-30

## 2023-03-30 RX ORDER — CYCLOBENZAPRINE HYDROCHLORIDE 10 MG/1
10 TABLET, FILM COATED ORAL ONCE
Refills: 0 | Status: COMPLETED | OUTPATIENT
Start: 2023-03-30 | End: 2023-03-30

## 2023-03-30 RX ADMIN — SODIUM CHLORIDE 1000 MILLILITER(S): 9 INJECTION INTRAMUSCULAR; INTRAVENOUS; SUBCUTANEOUS at 21:45

## 2023-03-30 RX ADMIN — MORPHINE SULFATE 4 MILLIGRAM(S): 50 CAPSULE, EXTENDED RELEASE ORAL at 20:15

## 2023-03-30 RX ADMIN — CYCLOBENZAPRINE HYDROCHLORIDE 10 MILLIGRAM(S): 10 TABLET, FILM COATED ORAL at 21:45

## 2023-03-30 NOTE — ED PROVIDER NOTE - PATIENT PORTAL LINK FT
You can access the FollowMyHealth Patient Portal offered by [facility] by registering at the following website: [email or web address]. By joining [employer]’s FollowMyHealth portal, you will also be able to view your health information using other applications (apps) compatible with our system.

## 2023-03-30 NOTE — ED PROVIDER NOTE - NSFOLLOWUPINSTRUCTIONS_ED_ALL_ED_FT
You were seen in the Emergency Room today because of abdominal pain. A copy of your results is included in your discharge paperwork.     Please follow-up with your Primary Care Physician as soon as possible.   Otherwise please return as soon as possible after arranging your family concerns.     WHAT YOU NEED TO KNOW   Pain in the abdomen (abdominal pain) can be caused by many things. Often, abdominal pain is not serious and it gets better with no treatment or by being treated at home. However, sometimes abdominal pain is serious.    Contact a health care provider if:  •Your abdominal pain changes or gets worse.  •You are not hungry or you lose weight without trying.  •You are constipated or have diarrhea for more than 2–3 days.  •You have pain when you urinate or have a bowel movement.  •Your abdominal pain wakes you up at night.  •Your pain gets worse with meals, after eating, or with certain foods.  •You are vomiting and cannot keep anything down.  •You have a fever.  •You have blood in your urine.    Please return to the Emergency Room if:   •You cannot stop vomiting.  •You have bloody or black stools, or stools that look like tar.  •You have severe pain, cramping, or bloating in your abdomen that do not go away with pain medication.  •You have signs of dehydration, such sunken eyes, sleepiness, or weakness.   •You have trouble breathing or chest pain.

## 2023-03-30 NOTE — ED ADULT NURSE NOTE - OBJECTIVE STATEMENT
36 y/o female presents to ED c/o R sided abdominal pain x few days. +nausea. Denying fever, chills, SOB, chest pain, vomiting. Pt is A&O x 4. Breathing even and unlabored. Skin warm, dry, intact. Gross motor and neuro intact. Abdomen is soft, nondistended, tender to palpation by R side. 20G IV placed in LAC. Safety and comfort provided.

## 2023-03-30 NOTE — ED PROVIDER NOTE - OBJECTIVE STATEMENT
36yo F with no PMH, PSH of appendectomy presenting with RLQ abdominal pain x 5 days. Reports she woke up with the pain. Pain is sharp and constant, initially nonradiating, now radiates down R leg. Taking tylenol and motrin for pain, last took both 2 hours ago. Reports pain is worse when sitting, better when she gets up and walks around. +associated poor appetite and constipation. Denies fever/chills, cp, sob, n/v/d, melena, urinary symptoms, vaginal bleeding or abnl discharge, injury/trauma. Does have hx of ovarian cysts. LMP 2 weeks ago. 36yo F with no PMH, PSH of appendectomy presenting with RLQ abdominal pain x 5 days. Reports she woke up with the pain. Pain is sharp and constant, initially nonradiating, now radiates down anterior R thigh. Taking tylenol and motrin for pain, last took both 2 hours ago. Reports pain is worse when sitting, better when she gets up and walks around. +associated poor appetite and constipation. Denies fever/chills, cp, sob, n/v/d, melena, urinary symptoms, vaginal bleeding or abnl discharge, injury/trauma. Does have hx of ovarian cysts. LMP 2 weeks ago. Left Knee Arhtroplasty

## 2023-03-30 NOTE — ED PROVIDER NOTE - NSFOLLOWUPCLINICS_GEN_ALL_ED_FT
[facility]  OB/GYN & Surg  [address] [place] [id number]  Phone: [phone]  Fax:     [facility]  Gynceology/OB  [address] [place] [address]  Phone: [phone]  Fax:

## 2023-03-30 NOTE — ED PROVIDER NOTE - MDM ORDERS SUBMITTED SELECTION
Assessment/Plan:      Diagnoses and all orders for this visit:    Lumbar spine pain  -     EMG - Clinic; Standing  -     EMG - Clinic  -     Ambulatory referral to Physical Therapy    Degenerative lumbar disc  -     EMG - Clinic; Standing  -     EMG - Clinic  -     Ambulatory referral to Physical Therapy    Left leg paresthesias  -     EMG - Clinic; Standing  -     EMG - Clinic    Myofascial pain  -     topiramate (TOPAMAX) 25 MG tablet; Take 1 tablet (25 mg total) by mouth 2 (two) times a day.  Dispense: 60 tablet; Refill: 1  -     Ambulatory referral to Physical Therapy    Sleep difficulties  -     topiramate (TOPAMAX) 25 MG tablet; Take 1 tablet (25 mg total) by mouth 2 (two) times a day.  Dispense: 60 tablet; Refill: 1    Numbness and tingling in left arm    Annular disc tear  -     topiramate (TOPAMAX) 25 MG tablet; Take 1 tablet (25 mg total) by mouth 2 (two) times a day.  Dispense: 60 tablet; Refill: 1  -     Ambulatory referral to Physical Therapy        Assessment:Very pleasant 33-year-old female with history of depression and anxiety with:     1.  Chronic lumbar spine pain and left lower extremity pain and paresthesias.  Status post hemilaminectomy with microdiscectomy in September 2012 followed by recurrent disc herniation in December 2012 by Dr. Oswald.  Persistent pain since.   She does have degenerative disc disease at L4-5 and L5-S1 with annular tear at L4-5.  Some moderate foraminal stenosis on the left at L5-S1 no significant nerve compression at any level on MRI from October 2016.  Has had numerous treatments in the past including [facility] physical therapy, multiple injections including transforaminal epidurals, medial branch blocks, SI joints.  Wants to avoid surgery.      2.  Pain across the lumbar spine gluteal region consistent with myofascial pain  3.   left shoulder pain with left arm paresthesias intermittently for the past 2 weeks likely rotator cuff strain.  Reassurance provided.  4.  Poor  "sleep related to pain         Discussion:    1.  I discussed the diagnoses and treatment options.  We discussed the option of further injections, therapy, medications, manual medicine.  We discussed conservative treatments have been done in the past along with her MRI and plain films.  2.  Start physical therapy for postural restoration therapy.  3.  Continue plan of behavioral health evaluation.  4.  Trial Topamax for myofascial pain sleep and pain.  Will start 25 mg at bedtime increasing to twice daily.  5.  Recommend returning in the next couple of weeks for trial of OMT.  Given the duration of her symptoms would like to trial whole body treatment.  6.  Reassurance provided for the left shoulder and arm symptoms.  7.  Consideration for transforaminal epidural at L4-5 given the annular tear but will hold off for now.  8.  Return at her earliest convenience for OMT trial.      25 minutes were spent with this patient in addition to any procedure with greater than 20 minutes in counseling and coordination of care.    It was our pleasure caring for your patient today, if there any questions or concerns please do not hesitate to contact us.      Subjective:   Patient ID: Casandra Hopkins is a 33 y.o. female.    History of Present Illness: Patient presents for reevaluation of multiple pain complaints.  She continues to have severe lumbar spine pain across lumbosacral junction to the gluteal tissues.  She has left leg numbness and tingling down the back of the leg to the heel.  This pain is unchanged or potentially worsened.  She does note increased \"SI pain \"after the physical exam at her last appointment.  Her pain is worse with any one position for long period of time and better with using ice.  Pain is a 6/10 today 9/10 at worst.    She also notes some left shoulder pain with left arm paresthesias.  This started over the past 2 weeks.  Awoke with this pain.  It is now intermittent numbness and tingling down the arm " but the pain is the worse in the shoulder.  Wants to know if this is related to the lumbar spine.  Seems to be worse at night.      Imaging: Flexion-extension x-rays personally reviewed and discussed with the patient.  No instability from flexion to extension.    MRI from October 2016 personally reviewed images today.  Vertebral body heights normal.  Discs are normal through the L3-4 level.  L4-5 degenerative disc disease with a small central disc bulge with annular tear and no nerve compression.  At L5-S1 left hemilaminectomy.  Mild broad-based disc bulge and degenerative disc disease with mild to moderate left foraminal stenosis but no high-grade nerve impingement seen.    Review of Systems: Numbness and tingling in the left arm and leg.  Feels weak .   No bowel or bladder incontinence.  No headaches, dizziness, nausea, vomiting, blurred vision or balance deficits.  Past Medical History:   Diagnosis Date     Anxiety      Depression        The following portions of the patient's history were reviewed and updated as appropriate: allergies, current medications, past family history, past medical history, past social history, past surgical history and problem list.      Objective:   Physical Exam:    Vitals:    06/22/17 0937   BP: 119/59   Pulse: 83       General: Alert and oriented with normal affect. Attention, knowledge, memory, and language are intact. No acute distress.   Eyes: Sclerae are clear.  Respirations: Unlabored. CV: No lower extremity edema.   Gait:  Nonantalgic  Pain in the left shoulder with internal/external rotation.  Negative Can.  Tenderness over the shoulder posteriorly.  Sensation is intact to light touch throughout the upper and lower extremities.  Reflexes are 2+ and symmetric in the biceps triceps and brachioradialis with negative Hoffmans. 2+ patellar and trace left, 1+ right Achilles..    Manual muscle testing reveals:  Right /Left out of 5  5/5 shoulder abductors  5/5 elbow flexors  5/5  elbow extensors  5/5 wrist extensors  5/5 interosseus  5/5 finger flexors     5/5 ankle plantar flexors  5/5 ankle dorsiflexors  5/5  L       Labs/Imaging Studies/Medications

## 2023-03-30 NOTE — ED PROVIDER NOTE - PROGRESS NOTE DETAILS
Received signout Dr Arndt  36yo Female romote hx of appy/guillermina/c-sec comes to ed c/o rlq abd pain. for past 5d days sharp constant w rad to rt knee ankle, "feels like it might be a nerver pain"   Has been taking Motrin/tylenol w only mild improvement. Worse w sitting improves w walking around. PE  Adult female awake alert looking mildly uncomfortable  Heent normocephalic atraumatic chest clear anterior & posterior anterior & posterior cv no rubs, gallops or murmurs abd mild rlq ttp no rebound guarding masses. neuro no awake alert moves  all extr  CT pending.  Hayes Ferro MD, Facep PT ct results back ? Rt side LN. No definitive dx to explain pt symptoms. Called pt Private Physician Tali Carbone w/o answer. Planned to admit pt and she refused. "I have to leave now' Pt has someone coming to pick her up now. Risks explained including undiagnosed infection. Had contacted Hospitalist who accepted pt for admission. With the plan for MRI abd/pelvis/hip and gyn/gi consult. PT competent to make medical decisions. Going forward she is going to attempt to take care of family matters and return to hospital if not improved. Pt competent to make medical decisions.   Hayes Ferro MD, Facep Elvira Zelaya MD (Attending Physician): Patient unsure unsure why she also should be admitted since CT scan of abdomen non-specific. labs unremarkable. if patient can do workup outpt, wants to be dc. stable for dc PT ct results back ? Rt side LN. No definitive dx to explain pt symptoms. Called pt Private Physician Tali Carbone w/o answer. Planned to admit pt and she refused. "I have to leave now' Pt has someone coming to pick her up now. Risks explained including undiagnosed infection. Had contacted Hospitalist who accepted pt for admission. With the plan for MRI abd/pelvis/hip and gyn/gi consult. PT competent to make medical decisions. Going forward she is going to attempt to take care of family matters and return to hospital if not improved. Pt competent to make medical decisions.    Hayes Ferro MD, Facep Brief phone  follow up. PT states symptoms have resolved.  Hayes Ferro MD, Facep

## 2023-03-30 NOTE — ED PROVIDER NOTE - NS ED ATTENDING STATEMENT MOD
This was a shared visit with the THONY. I reviewed and verified the documentation and independently performed the documented:

## 2023-03-31 VITALS
SYSTOLIC BLOOD PRESSURE: 133 MMHG | TEMPERATURE: 98 F | HEART RATE: 79 BPM | RESPIRATION RATE: 18 BRPM | OXYGEN SATURATION: 98 % | DIASTOLIC BLOOD PRESSURE: 81 MMHG

## 2023-03-31 LAB
APPEARANCE UR: CLEAR — SIGNIFICANT CHANGE UP
BACTERIA # UR AUTO: NEGATIVE — SIGNIFICANT CHANGE UP
BILIRUB UR-MCNC: NEGATIVE — SIGNIFICANT CHANGE UP
COLOR SPEC: YELLOW — SIGNIFICANT CHANGE UP
DIFF PNL FLD: ABNORMAL
EPI CELLS # UR: 13 /HPF — HIGH
GLUCOSE UR QL: NEGATIVE — SIGNIFICANT CHANGE UP
HYALINE CASTS # UR AUTO: 14 /LPF — HIGH (ref 0–2)
KETONES UR-MCNC: NEGATIVE — SIGNIFICANT CHANGE UP
LEUKOCYTE ESTERASE UR-ACNC: NEGATIVE — SIGNIFICANT CHANGE UP
NITRITE UR-MCNC: NEGATIVE — SIGNIFICANT CHANGE UP
PH UR: 6.5 — SIGNIFICANT CHANGE UP (ref 5–8)
PROT UR-MCNC: ABNORMAL
RBC CASTS # UR COMP ASSIST: 6 /HPF — HIGH (ref 0–4)
SP GR SPEC: 1.04 — HIGH (ref 1.01–1.02)
UROBILINOGEN FLD QL: NEGATIVE — SIGNIFICANT CHANGE UP
WBC UR QL: 8 /HPF — HIGH (ref 0–5)

## 2023-03-31 PROCEDURE — 93975 VASCULAR STUDY: CPT

## 2023-03-31 PROCEDURE — 83605 ASSAY OF LACTIC ACID: CPT

## 2023-03-31 PROCEDURE — 80053 COMPREHEN METABOLIC PANEL: CPT

## 2023-03-31 PROCEDURE — 87086 URINE CULTURE/COLONY COUNT: CPT

## 2023-03-31 PROCEDURE — 74176 CT ABD & PELVIS W/O CONTRAST: CPT | Mod: MA

## 2023-03-31 PROCEDURE — 84132 ASSAY OF SERUM POTASSIUM: CPT

## 2023-03-31 PROCEDURE — 81001 URINALYSIS AUTO W/SCOPE: CPT

## 2023-03-31 PROCEDURE — 85018 HEMOGLOBIN: CPT

## 2023-03-31 PROCEDURE — 82435 ASSAY OF BLOOD CHLORIDE: CPT

## 2023-03-31 PROCEDURE — 82330 ASSAY OF CALCIUM: CPT

## 2023-03-31 PROCEDURE — 76830 TRANSVAGINAL US NON-OB: CPT

## 2023-03-31 PROCEDURE — 82803 BLOOD GASES ANY COMBINATION: CPT

## 2023-03-31 PROCEDURE — 85025 COMPLETE CBC W/AUTO DIFF WBC: CPT

## 2023-03-31 PROCEDURE — 99285 EMERGENCY DEPT VISIT HI MDM: CPT | Mod: 25

## 2023-03-31 PROCEDURE — 96374 THER/PROPH/DIAG INJ IV PUSH: CPT

## 2023-03-31 PROCEDURE — 84295 ASSAY OF SERUM SODIUM: CPT

## 2023-03-31 PROCEDURE — 96376 TX/PRO/DX INJ SAME DRUG ADON: CPT

## 2023-03-31 PROCEDURE — 82947 ASSAY GLUCOSE BLOOD QUANT: CPT

## 2023-03-31 PROCEDURE — 84702 CHORIONIC GONADOTROPIN TEST: CPT

## 2023-03-31 PROCEDURE — 85014 HEMATOCRIT: CPT

## 2023-03-31 RX ORDER — DIAZEPAM 5 MG
1 TABLET ORAL
Qty: 9 | Refills: 0
Start: 2023-03-31

## 2023-03-31 RX ADMIN — MORPHINE SULFATE 4 MILLIGRAM(S): 50 CAPSULE, EXTENDED RELEASE ORAL at 00:47

## 2023-03-31 RX ADMIN — MORPHINE SULFATE 4 MILLIGRAM(S): 50 CAPSULE, EXTENDED RELEASE ORAL at 01:17

## 2023-03-31 RX ADMIN — MORPHINE SULFATE 4 MILLIGRAM(S): 50 CAPSULE, EXTENDED RELEASE ORAL at 00:42

## 2023-03-31 NOTE — ED POST DISCHARGE NOTE - ADDITIONAL DOCUMENTATION
3/31/23- Caryn DUMONT: called patient at request of Dr. Ferro, to see how patient feeling. pt reports that she was just waking up. still with pain about the same but was able to sleep. no n/v/d. able to tolerate po. pt instructed that if pain does not improve or worsens then she should return to the hospital for further work-up. pt also told that she needs to see her pcp and ob gyn and reviewed her ultrasound results with her. pt understands and agrees to. strict return precautions given 3/31/23- Caryn DUMONT: called patient at request of Dr. Ferro, to see how patient feeling. pt reports that she was just waking up. still with pain about the same but was able to sleep. no n/v/d. able to tolerate po. Told patient that the ED attending had wanted her admitted to hospital for further work-up/management, pt aware. pt instructed that if pain does not improve or worsens then she should return to the hospital for further work-up. pt also told that she needs to see her pcp and ob gyn and reviewed her ultrasound results with her. pt understands and agrees to. strict return precautions given

## 2023-04-01 LAB
CULTURE RESULTS: SIGNIFICANT CHANGE UP
SPECIMEN SOURCE: SIGNIFICANT CHANGE UP

## 2023-06-06 NOTE — ED ADULT TRIAGE NOTE - AS TEMP SITE
Subjective:      Patient ID:  Calderon Ayala is a 72 y.o. male who presents for No chief complaint on file.    Patient present for skin check.  LOV: 12/2022 with Dr. Manriquez.    C/o Lesion to right temple, rough spots. No tx  Desires refill of chemo cream  Lesion right cheek, bothersome when shaves. Desires removal.     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   HENT:  Negative for sore throat.    Respiratory:  Negative for cough.    Gastrointestinal:  Negative for nausea and vomiting.   Skin:  Positive for activity-related sunscreen use. Negative for itching, rash and dry skin.     Objective:   Physical Exam   Constitutional: He appears well-developed and well-nourished. No distress.   HENT:   Mouth/Throat: Lips normal.    Eyes: Lids are normal.  No conjunctival no injection.   Neurological: He is alert and oriented to person, place, and time. He is not disoriented.   Psychiatric: He has a normal mood and affect.   Skin:   Areas Examined (abnormalities noted in diagram):   Scalp / Hair Palpated and Inspected  Head / Face Inspection Performed  Neck Inspection Performed  RUE Inspected  LUE Inspection Performed  Nails and Digits Inspection Performed               Diagram Legend     Erythematous scaling macule/papule c/w actinic keratosis       Vascular papule c/w angioma      Pigmented verrucoid papule/plaque c/w seborrheic keratosis      Yellow umbilicated papule c/w sebaceous hyperplasia      Irregularly shaped tan macule c/w lentigo     1-2  mm smooth white papules consistent with Milia      Movable subcutaneous cyst with punctum c/w epidermal inclusion cyst      Subcutaneous movable cyst c/w pilar cyst      Firm pink to brown papule c/w dermatofibroma      Pedunculated fleshy papule(s) c/w skin tag(s)      Evenly pigmented macule c/w junctional nevus     Mildly variegated pigmented, slightly irregular-bordered macule c/w mildly atypical nevus      Flesh colored to evenly pigmented papule c/w intradermal nevus       Pink pearly papule/plaque c/w basal cell carcinoma      Erythematous hyperkeratotic cursted plaque c/w SCC      Surgical scar with no sign of skin cancer recurrence      Open and closed comedones      Inflammatory papules and pustules      Verrucoid papule consistent consistent with wart     Erythematous eczematous patches and plaques     Dystrophic onycholytic nail with subungual debris c/w onychomycosis     Umbilicated papule    Erythematous-base heme-crusted tan verrucoid plaque consistent with inflamed seborrheic keratosis     Erythematous Silvery Scaling Plaque c/w Psoriasis     See annotation      Assessment / Plan:        Actinic keratoses  Cryosurgery Procedure Note    Verbal consent from the patient is obtained and the patient is aware of the precancerous quality and need for treatment of these lesions. Liquid nitrogen cryosurgery is applied to the 9 actinic keratoses, as detailed in the physical exam, to produce a freeze injury. The patient is aware that blisters may form and is instructed on wound care with gentle cleansing and use of vaseline ointment to keep moist until healed. The patient is supplied a handout on cryosurgery and is instructed to call if lesions do not completely resolve. Discussed risk postinflammatory pigmentary changes.       Sun-damaged skin  -     fluorouraciL (EFUDEX) 5 % cream; Aaa bid x 1-2 weeks  Dispense: 40 g; Refill: 3  Stop if irritation is severe  Allow to heal from cryo prior to tx    Inflamed  seborrheic keratosis  Cryosurgery procedure note:    Verbal consent from the patient is obtained. Liquid nitrogen cryosurgery is applied to 1 lesions to produce a freeze injury. The patient is aware that blisters may form and is instructed on wound care with gentle cleansing and use of vaseline ointment to keep moist until healed. The patient is supplied a handout on cryosurgery and is instructed to call if lesions do not completely resolve. Risk of dyspigmentation discussed.       Seborrheic keratoses, chest  These are benign inherited growths without a malignant potential. Reassurance given to patient. No treatment is necessary.                Follow up in about 6 months (around 12/6/2023).   oral

## 2023-09-20 NOTE — PATIENT PROFILE ADULT - NSPROPTRIGHTBILLOFRIGHTS_GEN_A_NUR
Chief Complaint   Patient presents with   • Consultation     Transfer of care- 1 year fu        HISTORY OF PRESENT ILLNESS     Yasmany Kang is a 63 year old male who presents today for transfer of care. He was previously following up with Dr. Lucas who is leaving. Patient has a PMH of coronary artery disease s/p KECIA x 2 to the proximal to mid LAD on 9/12/2015, cardiomyopathy, and dyslipidemia.     Today, patient presents to clinic unaccompanied. He reports that he has been feeling well from a cardiac standpoint. Denies any chest pain, pressure, shortness of breath, palpitations, dizziness, lightheadedness, syncope, or lower extremity edema. 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 Patient remains compliant with medications and denies any side effects. Denies tobacco or illicit drug use. Endorses occasional alcohol use but not daily. Patient has not had his cholesterol checked in awhile because he has not seen his PCP recently. He admits he does not like doing to the doctor's. No other complaints at this time.      PAST MEDICAL, FAMILY, AND SOCIAL HISTORY     I have personally reviewed and updated (when pertinent) the following EPIC sections: Current medications, Allergies, Problem list, Past Medical History, Past Surgical History, Social History and Family History    REVIEW OF SYSTEMS     Review of systems negative except as otherwise detailed in HPI.     PHYSICAL EXAM     Vitals:    09/20/23 1542   BP: 122/80   BP Location: RUE - Right upper extremity   Patient Position: Sitting   Cuff Size: Large Adult   Pulse: 74   Weight: 128.1 kg (282 lb 6.6 oz)   Height: 6' 3\" (1.905 m)       Constitutional:  Well developed, well nourished, no acute distress, nontoxic appearance.  Eyes:  Conjunctivae normal.   HENT:  Atraumatic, external ears normal, nose normal,  Neck-no JVD (jugular venous  distension), no carotid bruit, no thyromegaly.  Respiratory:  No respiratory distress, normal breath sounds, no rales, no wheezing.   Cardiovascular:  Normal rate, normal rhythm, no murmurs, no gallops, no rubs.   Gastrointestinal:  Soft, nondistended, normal bowel sounds.   Musculoskeletal:  No edema, no tenderness, no deformities. Back- no tenderness  Integument:  Well hydrated, no rash.   Neurologic:  Alert and oriented x 3, no focal deficits noted.  Psychiatric:  Speech and behavior appropriate.     Labs pertinent to today's visit:   Sodium (mmol/L)   Date Value   02/12/2020 143     Potassium (mmol/L)   Date Value   02/12/2020 4.0     Chloride (mmol/L)   Date Value   02/12/2020 113 (H)     Glucose (mg/dL)   Date Value   02/12/2020 97     Calcium (mg/dL)   Date Value   02/12/2020 9.3     Carbon Dioxide (mmol/L)   Date Value   02/12/2020 24     BUN (mg/dL)   Date Value   02/12/2020 13     Creatinine (mg/dL)   Date Value   12/12/2022 1.11     CHOLESTEROL (mg/dL)   Date Value   01/15/2018 170     HDL (mg/dL)   Date Value   01/15/2018 42     CHOL/HDL (no units)   Date Value   01/15/2018 4.0     TRIGLYCERIDE (mg/dL)   Date Value   01/15/2018 194 (H)     CALCULATED LDL (mg/dL)   Date Value   01/15/2018 89        CARDIAC DIAGNOSTICS:    Cardiac Catheterization 9/12/2015  1. Left main is free of disease.  2. % occluded in its midsection.   3. Circumflex artery is a codominant, free of disease. Right coronary artery has a 30-40% mid lesion, codominant artery.  4. Left ventricle: Mid to apex is all dilated and aneurysmal. Overall, EF around 30-35%.  5. Percutaneous coronary intervention of the left anterior descending was done as described above. A 3.5x38 mm overlapping with a 4.0x28 mm Xience Xpediction stents were deployed, postdilated with a 4.5 noncompliant balloon. Stenosis was brought down to 0%. Near normal CARMELLA 3 flow established in the LAD, but the distal LAD still had some sluggish flow. Coinciding with ST  elevations still on the EKG. Intracoronary Verapamil, Nitroglycerin and Integrilin half of the second half bolus was given.     Stress Echo 11/18/2020  Normal Dobutamine stress echocardiogram.  Aoical infarct without Ischemia.  Mild decrease in LVEF    Echo 8/20/2021   Normal left ventricular chamber size. Mildly increased left ventricular wall thickness. Left ventricular regional wall motion abnormalities  (see diagram). Mildly reduced left ventricular systolic function with ejection fraction of 49 %. Grade I left ventricular diastolic  dysfunction. LV Global longitudinal strain -12.0 %.  No significant valve abnormalities.  RVSP could not be calculated due to incomplete tricuspid regurgitation velocity profile.  Borderline dilated ascending aorta, 3.8 cm.  No significant change since the prior study dated 8/18/2020.    ASSESSMENT/PLAN     1. Coronary Artery Disease: S/p 3.5x38 mm overlapping with a 4.0x28 mm Xpedition stents to proximal to the mid LAD on 9/12/2015. Stress echo 11/2020 with prior apical infarct and mild decrease in EF, but negative for ischemia. Denies angina or anginal equivalent. On ASA, Plavix, BB, and statin.   2. Cardiomyopathy: LVEF 49% per echo 8/2021, unchanged from previous. Compensated on exam. On Toprol-XL 50 mg daily and losartan 25 mg daily.   3. Hyperlipidemia: No FLP since 2018. On atorvastatin 40 mg daily. Managed by PCP.   4. Polymyalgia Rheumatica: Reports bilateral knee, shoulder, and elbow pain. He was previously taking methotrexate but stopped as it was not effective. Follows with rheumatology.     Plan:  Will obtain FLP/LFT to reassess cholesterol levels and liver function and call patient with results.   Continue current medication regimen and risk factor modification.   Will obtain echocardiogram to assess heart structure and function prior to next visit.     Return in about 1 year (around 9/20/2024). or sooner if there are new or worsening symptoms.     On 9/20/2023, [facility]  Vicky Perez scribed the services personally performed by Akil Sherwood MD.     The documentation recorded by the scribe accurately and completely reflects the service(s) I personally performed and the decisions made by me.            Akil Sherwood MD, FACC, [facility]  Clinical Adjunct   Department of Medicine  [employer]  [facility]                  patient

## 2023-09-25 NOTE — ED ADULT NURSE NOTE - CAS EDP DISCH TYPE
Please let the patient know that results of the urine analysis is still showing white cells and red cells he definitely needs to see urology and he is strongly encouraged to keep his appointment with them Home

## 2024-01-14 NOTE — ED PROVIDER NOTE - CARE PLAN
Principal Discharge DX:	Nausea and vomiting during pregnancy prior to 22 weeks gestation stated Principal Discharge DX:	Nausea and vomiting during pregnancy prior to 22 weeks gestation  Instructions for follow-up, activity and diet:	- Take Tylenol 1000mg every 6 hours for pain  - Take Zofran Principal Discharge DX:	Nausea and vomiting during pregnancy prior to 22 weeks gestation  Instructions for follow-up, activity and diet:	- Take Tylenol 1000mg every 6 hours for pain  - Take Zofran 8mg every 12 hours as needed for nausea  - Take Macrobid 100mg twice per day with meals for 7 days for UTI  - Follow up with recommended GYN doctor provided to you by our staff regarding your ovarian cysts. Follow up 6 weeks after your appointment at [facility].  - Return to ER if you experience worsening symptoms such as increased bleeding, increased vomiting, or sign of infection such as high fever. Principal Discharge DX:	Nausea and vomiting during pregnancy prior to 22 weeks gestation  Instructions for follow-up, activity and diet:	- Take Tylenol 1000mg every 6 hours for pain  - Take Zofran 8mg every 12 hours as needed for nausea  - Take Macrobid 100mg twice per day with meals for 7 days for UTI  - Follow up with recommended GYN doctor provided to you by our staff regarding your ovarian cysts. Follow up 6 weeks after your appointment at [facility].  - Return to ER if you experience worsening symptoms such as increased bleeding, increased vomiting, or sign of infection such as high fever.  Secondary Diagnosis:	Urinary tract infection without hematuria, site unspecified

## 2024-03-26 ENCOUNTER — NON-APPOINTMENT (OUTPATIENT)
Age: 39
End: 2024-03-26

## 2024-03-27 ENCOUNTER — NON-APPOINTMENT (OUTPATIENT)
Age: 39
End: 2024-03-27

## 2024-03-27 ENCOUNTER — APPOINTMENT (OUTPATIENT)
Dept: ORTHOPEDIC SURGERY | Facility: CLINIC | Age: 39
End: 2024-03-27
Payer: COMMERCIAL

## 2024-03-27 VITALS — HEART RATE: 75 BPM | DIASTOLIC BLOOD PRESSURE: 79 MMHG | SYSTOLIC BLOOD PRESSURE: 117 MMHG

## 2024-03-27 VITALS — HEIGHT: 64 IN | BODY MASS INDEX: 34.15 KG/M2 | WEIGHT: 200 LBS

## 2024-03-27 DIAGNOSIS — M79.673 PAIN IN UNSPECIFIED FOOT: ICD-10-CM

## 2024-03-27 DIAGNOSIS — M25.579 PAIN IN UNSPECIFIED ANKLE AND JOINTS OF UNSPECIFIED FOOT: ICD-10-CM

## 2024-03-27 DIAGNOSIS — S92.255A NONDISPLACED FRACTURE OF NAVICULAR [SCAPHOID] OF LEFT FOOT, INITIAL ENCOUNTER FOR CLOSED FRACTURE: ICD-10-CM

## 2024-03-27 PROCEDURE — 73630 X-RAY EXAM OF FOOT: CPT | Mod: LT

## 2024-03-27 PROCEDURE — 73610 X-RAY EXAM OF ANKLE: CPT | Mod: LT

## 2024-03-27 PROCEDURE — 99204 OFFICE O/P NEW MOD 45 MIN: CPT | Mod: 25

## 2024-03-27 RX ORDER — MELOXICAM 7.5 MG/1
7.5 TABLET ORAL
Qty: 42 | Refills: 1 | Status: ACTIVE | COMMUNITY
Start: 2024-03-27 | End: 1900-01-01

## 2024-03-27 NOTE — HISTORY OF PRESENT ILLNESS
[FreeTextEntry1] : JHONY LÓPEZ  is an 38 year-old female presents today with a chief complaint of left foot pain status post a mechanical fall twist and fall on 3/25/2024.  She reports she was carrying multiple things and was walking her friend's house when she lost her balance twisted her left ankle.  She had immediate pain and swelling and difficulty bearing weight.  She underwent x-rays that showed an avulsion fracture of the navicular and she was placed in a posterior splint.  She is coming in for further evaluation as she has continued pain and difficulty with weightbearing.  She reports points to the lateral ankle as the area of most pain.  Pain is 6 out of 10.  She has been taking ibuprofen for symptom relief.  Denies numbness or tingling  A complete review of symptoms as well as past medical/surgical history, medications, allergies, social and family history, and other details of HPI and exam were reviewed per first visit intake form and updated accordingly. Additional and more relevant details are noted in further detail today.

## 2024-03-27 NOTE — ASSESSMENT
[FreeTextEntry1] : Discussed the radiographic findings with the patient given her continued pain and the x-ray findings, I would like her to wear short boot and continued protected weightbearing with crutches.  I would like to obtain a CT scan of the left foot and ankle to obtain additional 3D imaging to rule out any additional fractures not seen on the x-ray.  She will follow-up after the CT scan is complete.  I discussed with them that often prescribe an anti-inflammatory that should be taken once a day with meals. They should not take this while also taking Aleve (Naprosyn), Motrin/ Advil (Ibuprofen), Toradol (ketoralac). They must stop taking it if they develops stomach pain, increased bleeding or bruising and they should follow-up with their primary care doctor for routine blood work including kidney function to monitor its effect. While it Is not a habit-forming substance, it should only  be taken as needed and to discontinue use once symptoms have resolved.  My cumulative time spent on this patients visit included: Preparation for the visit, review of the medical records, review of pertinent diagnostic studies, examination and counseling of the patient on the above diagnosis, treatment plan and prognosis, orders of diagnostic tests, medications and/or appropriate procedures and documentation in the medical records of todays visit.

## 2024-03-27 NOTE — PHYSICAL EXAM
[de-identified] : Constitutional: Well-nourished, well-developed, No acute distress  Respiratory:  Good respiratory effort, no SOB  Lymphatic: No regional lymphadenopathy, no lymphedema  Psychiatric: Pleasant and normal affect, alert and oriented x3  Skin: Clean dry and intact B/L UE/LE Musculoskeletal: normal except where as noted in regional exam     Left knee: No asymmetry, malalignment, or swelling, Full ROM, 5/5 strength in Flex/Ext, Joints stable    Left ankle:  APPEARANCE: Splint removed there is swelling and ecchymosis of the lateral aspect of the foot POSITIVE TENDERNESS: Tender along navicular NONTENDER: medial malleolus, lateral malleolus, tibialis posterior tendon, achilles tendon, no marked thickening of tendon, ATFL, CFL, PTFL, anterior tibiofibular ligament (high ankle), sinus tarsus, deltoid ligaments, 5th metatarsal.   RANGE OF MOTION: Pain with inversion SPECIAL TESTS: neg anterior drawer. neg talar tilt.  NEURO: Normal sensation of LE    [de-identified] : Imaging: 3 views of the left ankle were obtained today that show avulsion fracture of the navicular fracture.  There is no malalignment. There is no joint dislocation, or degenerative changes seen. No other obvious osseous abnormality. Otherwise unremarkable.  Imaging: 3 views of the left foot were obtained today that show avulsion fracture of the navicular fracture.  There is no malalignment. There is no joint dislocation, or degenerative changes seen. No other obvious osseous abnormality. Otherwise unremarkable.

## 2024-03-29 RX ORDER — MELOXICAM 15 MG/1
15 TABLET ORAL
Qty: 30 | Refills: 0 | Status: ACTIVE | COMMUNITY
Start: 2024-03-29 | End: 1900-01-01

## 2024-04-04 ENCOUNTER — OUTPATIENT (OUTPATIENT)
Dept: OUTPATIENT SERVICES | Facility: HOSPITAL | Age: 39
LOS: 1 days | End: 2024-04-04
Payer: COMMERCIAL

## 2024-04-04 ENCOUNTER — RESULT REVIEW (OUTPATIENT)
Age: 39
End: 2024-04-04

## 2024-04-04 DIAGNOSIS — S92.255A NONDISPLACED FRACTURE OF NAVICULAR [SCAPHOID] OF LEFT FOOT, INITIAL ENCOUNTER FOR CLOSED FRACTURE: ICD-10-CM

## 2024-04-04 DIAGNOSIS — Z98.89 OTHER SPECIFIED POSTPROCEDURAL STATES: Chronic | ICD-10-CM

## 2024-04-04 PROCEDURE — 73700 CT LOWER EXTREMITY W/O DYE: CPT

## 2024-04-04 PROCEDURE — 73700 CT LOWER EXTREMITY W/O DYE: CPT | Mod: 26,LT

## 2024-04-04 PROCEDURE — 76377 3D RENDER W/INTRP POSTPROCES: CPT

## 2024-04-04 PROCEDURE — 76377 3D RENDER W/INTRP POSTPROCES: CPT | Mod: 26

## 2024-04-16 ENCOUNTER — APPOINTMENT (OUTPATIENT)
Dept: ORTHOPEDIC SURGERY | Facility: CLINIC | Age: 39
End: 2024-04-16
Payer: COMMERCIAL

## 2024-04-16 PROCEDURE — 99213 OFFICE O/P EST LOW 20 MIN: CPT

## 2024-04-16 PROCEDURE — G2211 COMPLEX E/M VISIT ADD ON: CPT

## 2024-04-16 NOTE — HISTORY OF PRESENT ILLNESS
[FreeTextEntry1] : JHONY LÓPEZ  is an 38 year-old female presents today with a chief complaint of left foot pain status post a mechanical fall twist and fall on 3/25/2024.  She underwent x-rays that showed an avulsion fracture of the navicular and was placed in a boot by me in the office.  She underwent a CT scan for further evaluation of additional fractures of the foot and has been nonweightbearing in a boot and using a scooter.  She is here for CT scan review which also shows a cuboid avulsion fracture.  Her pain has improved over the past several weeks.  A complete review of symptoms as well as past medical/surgical history, medications, allergies, social and family history, and other details of HPI and exam were reviewed per first visit intake form and updated accordingly. Additional and more relevant details are noted in further detail today.

## 2024-04-16 NOTE — ASSESSMENT
[FreeTextEntry1] : I discussed with her the radiographic findings of a cuboid avulsion fracture.  I would like her to wear a boot for the next 8 weeks to allow for fracture healing.  She may continue use a scooter and as her symptoms improved she may attempt to weight-bear on the leg if symptoms allow.  I like you to take baby aspirin for DVT prophylaxis.  I like to see her back 8 weeks from injury at which point we will get a repeat x-ray if everything continues to be healing, we will progress her to weightbearing as tolerated without a boot.  All questions were answered and she is in agreement the plan.  She can continue to take an anti-inflammatory as needed.

## 2024-04-16 NOTE — PHYSICAL EXAM
[de-identified] : Left ankle:  APPEARANCE: Boot in place POSITIVE TENDERNESS: Tender along navicular and cuboid NONTENDER: medial malleolus, lateral malleolus, tibialis posterior tendon, achilles tendon, no marked thickening of tendon, ATFL, CFL, PTFL, anterior tibiofibular ligament (high ankle), sinus tarsus, deltoid ligaments, 5th metatarsal.   RANGE OF MOTION: No significant pain with range of motion SPECIAL TESTS: neg anterior drawer. neg talar tilt.  NEURO: Normal sensation of LE    [de-identified] : CC: [id number]     EXAM:  CT 3D RECONSTRUCT W SONYA   ORDERED BY: AMY CONNOLLY  ACC: [id number]     EXAM:  CT FOOT ONLY LT   ORDERED BY: AMY CONNOLLY  PROCEDURE DATE:  04/04/2024    INTERPRETATION:  Exam Type: CT FOOT LEFT, CT 3D RECONSTRUCTION W WORKSTATION Exam Date and Time: 4/4/2024 1:50 PM Indication: Concern for navicular fracture Comparison: Outside radiographs on 3/27/2024  TECHNIQUE: Multiplanar CT images of the left foot were obtained without contrast. 3-D reconstructions were performed  FINDINGS:  Acute avulsion fracture of the most lateral aspect of the cuboid. Age indeterminant avulsion fracture of the dorsal aspect of the navicular which appears sclerotic possibly subacute to chronic in etiology. Clinical correlation with history of trauma is advised. The ankle mortise is intact. There is a prominent Stieda process. Mild soft tissue swelling around the ankle. Normal muscle bulk.  IMPRESSION: 1.  Acute avulsion fracture of the most lateral aspect of the cuboid. 2.  Age indeterminant avulsion fracture of the dorsal aspect of the navicular which appears sclerotic possibly subacute to chronic in etiology. Clinical correlation with history of trauma is advised.  --- End of Report ---

## 2024-06-14 ENCOUNTER — APPOINTMENT (OUTPATIENT)
Dept: ORTHOPEDIC SURGERY | Facility: CLINIC | Age: 39
End: 2024-06-14

## 2024-06-14 PROCEDURE — 99213 OFFICE O/P EST LOW 20 MIN: CPT

## 2024-06-14 NOTE — RETURN TO WORK/SCHOOL
[FreeTextEntry1] : Patient was seen in the office today and will continue to need to utilize  parking until further notice. Patient will also continue to wear boot until further notice. Please do not hesitate to contact the office at [phone] with any questions or concerns. [FreeTextEntry2] : Dr. Meño Rich

## 2024-06-16 NOTE — PATIENT PROFILE ADULT - NSPROMUTANXFEARADDRESSFT_GEN_A_NUR
Pt ambulated to triage    Pt states yesterday she was taking a shower and \"something that looked like a chicken fetus\" came out of her.  Today she woke up today and she had blood coming out of her vagina.    
n/a

## 2024-07-09 ENCOUNTER — OUTPATIENT (OUTPATIENT)
Dept: OUTPATIENT SERVICES | Facility: HOSPITAL | Age: 39
LOS: 1 days | End: 2024-07-09
Payer: MEDICAID

## 2024-07-09 DIAGNOSIS — Z98.89 UNDEFINED: Chronic | ICD-10-CM

## 2024-07-09 DIAGNOSIS — S92.255A NONDISPLACED FRACTURE OF NAVICULAR [SCAPHOID] OF LEFT FOOT, INITIAL ENCOUNTER FOR CLOSED FRACTURE: ICD-10-CM

## 2024-07-09 PROCEDURE — 73718 MRI LOWER EXTREMITY W/O DYE: CPT

## 2024-07-09 PROCEDURE — 73718 MRI LOWER EXTREMITY W/O DYE: CPT | Mod: 26,LT

## 2024-07-16 NOTE — ED PROVIDER NOTE - TEMPLATE, MLM
Orthopedic Additional Notes: Patient consent was obtained to proceed with the visit and recommended plan of care after\\ndiscussion of all risks and benefits, including the risks of COVID-19 exposure. Render Risk Assessment In Note?: no Detail Level: Simple

## 2024-09-10 NOTE — ED ADULT TRIAGE NOTE - WEIGHT METHOD
Referral received to schedule a New Patient Appointment for Peripheral Nerve Disease. Patient was able to schedule with another location closer to home and a sooner date. Referral scanned in under media.   
stated

## 2024-11-27 NOTE — ED PROVIDER NOTE - CPE EDP PSYCH NORM
Received fax from [facility] from eye exam on 11/27/2024, no macular problem/toxicity.  Sent down for scanning.    normal...

## 2025-04-16 NOTE — ED PROVIDER NOTE - ATTENDING APP SHARED VISIT CONTRIBUTION OF CARE
182.88
I, Tono Arndt, performed a history and physical exam of the patient and discussed their management with the resident and/or advanced care provider. I reviewed the resident and/or advanced care provider's note and agree with the documented findings and plan of care. I was present and available for all procedures.    38yo F with no PMH, PSH of appendectomy presenting with RLQ abdominal pain x 5 days. Reports she woke up with the pain. Pain is sharp and constant, initially non-radiating, now radiates down anterior R thigh. Taking tylenol and motrin for pain, last took both 2 hours ago. Reports pain is worse when sitting, better when she gets up and walks around. +associated poor appetite and constipation. Denies fever/chills, cp, sob, n/v/d, melena, urinary symptoms, vaginal bleeding or abnl discharge, injury/trauma. Does have hx of ovarian cysts. LMP 2 weeks ago.    Well appearing and in NAD, head normal appearing atraumatic, trachea midline, no respiratory distress, lungs cta bilaterally, rrr no murmurs, soft rlq ttp r pelvic ttp, pelvic exam by chaparro avelar as chaperone with r ov ttp, ND abdomen, r thigh ttp soft compartments neurovasc intact distally no cva ttp, no ruq luq llq ttp, otherwise no visible extremity deformities, Alert and oriented, non focal neuro exam, skin warm and dry, normal affect and mood, No CTL spine midline tenderness palpation    Concerning for right lower quadrant pain in the setting of previous appendectomy otherwise pain radiating to right thigh possibly pelvic in etiology will obtain transvaginal ultrasound as well as CT scan abdomen pelvis screening blood work pain medication as needed unlikely spinal cord injury or back injury without CVA tenderness back tenderness palpation.  Otherwise disposition pending work-up and reevaluation unlikely ACS PE pneumothorax dissection AAA pneumonia discussed with patient agreed with plan